# Patient Record
Sex: FEMALE | Race: WHITE | NOT HISPANIC OR LATINO | Employment: UNEMPLOYED | URBAN - METROPOLITAN AREA
[De-identification: names, ages, dates, MRNs, and addresses within clinical notes are randomized per-mention and may not be internally consistent; named-entity substitution may affect disease eponyms.]

---
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
## 2022-03-04 ENCOUNTER — CLINICAL SUPPORT (OUTPATIENT)
Dept: FAMILY MEDICINE CLINIC | Facility: CLINIC | Age: 22
End: 2022-03-04
Payer: COMMERCIAL

## 2022-03-04 DIAGNOSIS — Z30.019 ENCOUNTER FOR FEMALE BIRTH CONTROL: Primary | ICD-10-CM

## 2022-03-04 PROCEDURE — 96372 THER/PROPH/DIAG INJ SC/IM: CPT

## 2022-03-04 RX ORDER — MEDROXYPROGESTERONE ACETATE 150 MG/ML
150 INJECTION, SUSPENSION INTRAMUSCULAR ONCE
Status: COMPLETED | OUTPATIENT
Start: 2022-03-04 | End: 2022-03-04

## 2022-03-04 RX ADMIN — MEDROXYPROGESTERONE ACETATE 150 MG: 150 INJECTION, SUSPENSION INTRAMUSCULAR at 09:15

## 2022-05-20 ENCOUNTER — CLINICAL SUPPORT (OUTPATIENT)
Dept: FAMILY MEDICINE CLINIC | Facility: CLINIC | Age: 22
End: 2022-05-20
Payer: COMMERCIAL

## 2022-05-20 VITALS — TEMPERATURE: 98.5 F

## 2022-05-20 DIAGNOSIS — Z30.8 ENCOUNTER FOR OTHER CONTRACEPTIVE MANAGEMENT: Primary | ICD-10-CM

## 2022-05-20 PROCEDURE — 96372 THER/PROPH/DIAG INJ SC/IM: CPT

## 2022-05-20 RX ADMIN — METHYLPREDNISOLONE ACETATE 40 MG: 40 INJECTION, SUSPENSION INTRA-ARTICULAR; INTRALESIONAL; INTRAMUSCULAR; SOFT TISSUE at 08:43

## 2022-07-16 ENCOUNTER — HOSPITAL ENCOUNTER (EMERGENCY)
Facility: HOSPITAL | Age: 22
Discharge: HOME | End: 2022-07-17
Attending: EMERGENCY MEDICINE | Admitting: EMERGENCY MEDICINE
Payer: COMMERCIAL

## 2022-07-16 VITALS
DIASTOLIC BLOOD PRESSURE: 80 MMHG | HEART RATE: 112 BPM | TEMPERATURE: 99 F | SYSTOLIC BLOOD PRESSURE: 143 MMHG | RESPIRATION RATE: 20 BRPM | OXYGEN SATURATION: 98 %

## 2022-07-16 DIAGNOSIS — T63.481A LOCAL REACTION TO HYMENOPTERA STING: Primary | ICD-10-CM

## 2022-07-16 PROCEDURE — 99283 EMERGENCY DEPT VISIT LOW MDM: CPT

## 2022-07-16 RX ORDER — DIPHENHYDRAMINE HYDROCHLORIDE 50 MG/ML
25 INJECTION INTRAMUSCULAR; INTRAVENOUS ONCE
Status: DISCONTINUED | OUTPATIENT
Start: 2022-07-16 | End: 2022-07-16

## 2022-07-16 RX ORDER — NAPROXEN 500 MG/1
500 TABLET ORAL ONCE
Status: COMPLETED | OUTPATIENT
Start: 2022-07-16 | End: 2022-07-16

## 2022-07-16 RX ORDER — DIPHENHYDRAMINE HCL 25 MG
25 TABLET ORAL ONCE
Status: COMPLETED | OUTPATIENT
Start: 2022-07-17 | End: 2022-07-16

## 2022-07-16 RX ORDER — LIDOCAINE 50 MG/G
1 PATCH TOPICAL ONCE
Status: DISCONTINUED | OUTPATIENT
Start: 2022-07-16 | End: 2022-07-17 | Stop reason: HOSPADM

## 2022-07-16 RX ADMIN — DIPHENHYDRAMINE HYDROCHLORIDE 25 MG: 25 TABLET ORAL at 23:53

## 2022-07-16 RX ADMIN — NAPROXEN 500 MG: 500 TABLET ORAL at 23:53

## 2022-07-16 RX ADMIN — LIDOCAINE 5% 1 PATCH: 700 PATCH TOPICAL at 23:53

## 2022-07-17 PROCEDURE — 99284 EMERGENCY DEPT VISIT MOD MDM: CPT | Performed by: EMERGENCY MEDICINE

## 2022-07-17 NOTE — ED PROVIDER NOTES
Final Diagnosis:  1  Local reaction to hymenoptera sting        Chief Complaint   Patient presents with   Hezzie Gal Sting     Patient stung bottom R foot by a bee yesterday  Redness and swelling noted R foot  Denies any additional C/o     HPI  Patient was stung on bottom of her foot by presumed bee in the grass  Pulled out partial stinger  That was yesterday  Now there's some erythema and discomfort  No warmth  Blanches  Minimally tender  No crepitus  No systemic symptoms  No respiratory symptoms  Localized only  No hives  Tried a dose of benadryl at home     - No language barrier    - History obtained from patient  - There are no limitations to the history obtained  - Previous charting underwent limited review with attention to last ED visits, labs, ekgs, and prior imaging  PMH:   has a past medical history of Allergic, Heart murmur, and Tachycardia (12/14/2018)  PSH:   has no past surgical history on file  Social History:    Tobacco Use: High Risk    Smoking Tobacco Use: Current Some Day Smoker    Smokeless Tobacco Use: Never Used     Alcohol Use: Not on file     Patient with no illicit use    ROS:    Pertinent positives/negatives:   Review of Systems   Skin: Positive for color change  CONSTITUTIONAL:  No dizziness  No weakness  No unexpected weight loss  EYES:  No pain, erythema, or discharge  No loss of vision  ENT:  No tinnitus, decreased hearing  No epistaxis/purulent drainage  No voice change, airway closing, trismus  CARDIOVASCULAR:  No chest pain  No palpitations  No new lower extremity edema  RESPIRATORY:  No purulent cough  No hemoptysis  No dyspnea  No paroxysmal nocturnal dyspnea  No stridor, audible wheezing bedside  GASTROINTESTINAL:  Normal appetite  No vomiting, diarrhea  No pain  No bloating  No melena  GENITOURINARY:  No frequency, urgency, nocturia  No hematuria or dysuria  No discharge  No sores/adenopathy     MUSCULOSKELETAL:  No arthralgias or myalgias that are new   INTEGUMENTARY:  No swelling  No unexpected contusions  No abrasions  No lymphangitis  NEUROLOGIC:  No meningismus  No numbness of the extremities  No new focal weakness  No postural instability  PSYCHIATRIC:  No SI HI AVH  HEMATOLOGICAL:  No bleeding  No petechiae  No bruising  ALLERGIES:  No urticaria  No sudden abd cramping  No stridor  PE:     Physical exam highlights:   Physical Exam       Vitals:    07/16/22 2334   BP: 143/80   BP Location: Right arm   Pulse: (!) 112   Resp: 20   Temp: 99 °F (37 2 °C)   TempSrc: Oral   SpO2: 98%     Vitals reviewed by me  Nursing note reviewed  Chaperone present for all sensitive exam   Const: No acute distress  Alert  Nontoxic  Not diaphoretic  HEENT: External ears normal  No protrusion drainage swelling  Nose normal  No drainage/traumatic deformity  MMM  Mouth with baseline/symmetric movement  No trismus  Eyes: No squinting  No icterus  Tracks through the room with normal EOM  No tearing/swelling/drainage  Neck: ROM normal  No rigidity  No meningismus  Cards: Rate as per vitals  Compared to monitor sinus unless documented above  Regular  Well perfused  Pulm: able to verbalize without additional effort  Effort and excursion normal  No disress  No audible wheezing/ stridor  Normal resp rate  Abd: No distension beyond baseline  No fluctuant wave  Patient without peritoneal pain with shifting/bumping the bed  MSK: ROM normal and baseline  No deformity  Skin: No new rashes visible  Well perfused  Erythema surrounding puncture site  Small area of dark central  Partial stinger? Neuro: Nonfocal  Baseline  CN grossly intact  Moving all four with coordination  Psych: Normal behavior and affect  A:  - Nursing note reviewed  Ddx and MDM  Lidocaine patch placed and removed like a face peel, pulls out the stinger  Tip  No orders to display     No orders of the defined types were placed in this encounter      Labs Reviewed - No data to display    Final Diagnosis:  1  Local reaction to hymenoptera sting        P:  - hospital tx includes   Medications   naproxen (NAPROSYN) tablet 500 mg (500 mg Oral Given 7/16/22 2353)   diphenhydrAMINE (BENADRYL) tablet 25 mg (25 mg Oral Given 7/16/22 2353)         - disposition  Time reflects when diagnosis was documented in both MDM as applicable and the Disposition within this note     Time User Action Codes Description Comment    7/17/2022 12:04 AM Noel Lucero Add [[id number] [id number]] Local reaction to hymenoptera sting       ED Disposition     ED Disposition   Discharge    Condition   Stable    Date/Time   Sun Jul 17, 2022 12:04 AM    Comment   Iggy Shell discharge to home/self care  Follow-up Information     Follow up With Specialties Details Why Contact Info Additional Information    Sunni Palacios MD Family Medicine  For wound re-check [address] [address]  [phone]       [facility]   [address] [facility] [address] [phone] [address], [address], [phone]          - patient will call their PCP to let them know they were in the emergency department  We discuss return precautions       - additional tx intended, if consistent with primary provider:  - patient to follow with :      Discharge Medication List as of 7/17/2022 12:06 AM      CONTINUE these medications which have NOT CHANGED    Details   medroxyPROGESTERone (DEPO-PROVERA) 150 mg/mL injection inject 1 milliliter intramuscularly every 3 months, Normal           No discharge procedures on file  Prior to Admission Medications   Prescriptions Last Dose Informant Patient Reported? Taking?    medroxyPROGESTERone (DEPO-PROVERA) 150 mg/mL injection More than a month at Unknown time  No No   Sig: inject 1 milliliter intramuscularly every 3 months      Facility-Administered Medications Last Administration Doses Remaining   medroxyPROGESTERone acetate (DEPO-PROVERA SYRINGE) IM injection 150 mg 4/22/2021 11:30 AM           Portions of the record may have been created with voice recognition software  Occasional wrong word or "sound a like" substitutions may have occurred due to the inherent limitations of voice recognition software  Read the chart carefully and recognize, using context, where substitutions have occurred      Electronically signed by:  MD Tara Lawrence MD  07/17/22 2058

## 2022-07-19 ENCOUNTER — HOSPITAL ENCOUNTER (EMERGENCY)
Facility: HOSPITAL | Age: 22
Discharge: HOME | End: 2022-07-19
Attending: EMERGENCY MEDICINE
Payer: COMMERCIAL

## 2022-07-19 VITALS
SYSTOLIC BLOOD PRESSURE: 123 MMHG | DIASTOLIC BLOOD PRESSURE: 71 MMHG | OXYGEN SATURATION: 98 % | HEART RATE: 76 BPM | WEIGHT: 165.34 LBS | RESPIRATION RATE: 21 BRPM | BODY MASS INDEX: 29.76 KG/M2 | TEMPERATURE: 98.6 F

## 2022-07-19 DIAGNOSIS — R00.2 PALPITATIONS: Primary | ICD-10-CM

## 2022-07-19 LAB
ALBUMIN SERPL BCP-MCNC: 4.3 G/DL (ref 3.5–5)
ALP SERPL-CCNC: 70 U/L (ref 46–384)
ALT SERPL W P-5'-P-CCNC: 25 U/L (ref 12–78)
ANION GAP SERPL CALCULATED.3IONS-SCNC: 11 MMOL/L (ref 4–13)
AST SERPL W P-5'-P-CCNC: 17 U/L (ref 5–45)
ATRIAL RATE: 99 BPM
BASOPHILS # BLD AUTO: 0.05 THOUSANDS/ΜL (ref 0–0.1)
BASOPHILS NFR BLD AUTO: 1 % (ref 0–1)
BILIRUB SERPL-MCNC: 0.53 MG/DL (ref 0.2–1)
BUN SERPL-MCNC: 9 MG/DL (ref 5–25)
CALCIUM SERPL-MCNC: 9.4 MG/DL (ref 8.3–10.1)
CARDIAC TROPONIN I PNL SERPL HS: <2 NG/L
CHLORIDE SERPL-SCNC: 106 MMOL/L (ref 96–108)
CO2 SERPL-SCNC: 25 MMOL/L (ref 21–32)
CREAT SERPL-MCNC: 1.13 MG/DL (ref 0.6–1.3)
D DIMER PPP FEU-MCNC: <0.27 UG/ML FEU
EOSINOPHIL # BLD AUTO: 0.07 THOUSAND/ΜL (ref 0–0.61)
EOSINOPHIL NFR BLD AUTO: 1 % (ref 0–6)
ERYTHROCYTE [DISTWIDTH] IN BLOOD BY AUTOMATED COUNT: 13.3 % (ref 11.6–15.1)
EXT PREG TEST URINE: NEGATIVE
EXT. CONTROL ED NAV: NORMAL
GFR SERPL CREATININE-BSD FRML MDRD: 70 ML/MIN/1.73SQ M
GLUCOSE SERPL-MCNC: 121 MG/DL (ref 65–140)
HCT VFR BLD AUTO: 40.9 % (ref 34.8–46.1)
HGB BLD-MCNC: 13.3 G/DL (ref 11.5–15.4)
IMM GRANULOCYTES # BLD AUTO: 0.02 THOUSAND/UL (ref 0–0.2)
IMM GRANULOCYTES NFR BLD AUTO: 0 % (ref 0–2)
LYMPHOCYTES # BLD AUTO: 1.68 THOUSANDS/ΜL (ref 0.6–4.47)
LYMPHOCYTES NFR BLD AUTO: 17 % (ref 14–44)
MAGNESIUM SERPL-MCNC: 2 MG/DL (ref 1.6–2.6)
MCH RBC QN AUTO: 28.7 PG (ref 26.8–34.3)
MCHC RBC AUTO-ENTMCNC: 32.5 G/DL (ref 31.4–37.4)
MCV RBC AUTO: 88 FL (ref 82–98)
MONOCYTES # BLD AUTO: 0.51 THOUSAND/ΜL (ref 0.17–1.22)
MONOCYTES NFR BLD AUTO: 5 % (ref 4–12)
NEUTROPHILS # BLD AUTO: 7.3 THOUSANDS/ΜL (ref 1.85–7.62)
NEUTS SEG NFR BLD AUTO: 76 % (ref 43–75)
NRBC BLD AUTO-RTO: 0 /100 WBCS
NT-PROBNP SERPL-MCNC: 21 PG/ML
P AXIS: 72 DEGREES
PLATELET # BLD AUTO: 256 THOUSANDS/UL (ref 149–390)
PMV BLD AUTO: 9.9 FL (ref 8.9–12.7)
POTASSIUM SERPL-SCNC: 3.5 MMOL/L (ref 3.5–5.3)
PR INTERVAL: 126 MS
PROT SERPL-MCNC: 7.8 G/DL (ref 6.4–8.4)
QRS AXIS: 80 DEGREES
QRSD INTERVAL: 76 MS
QT INTERVAL: 336 MS
QTC INTERVAL: 431 MS
RBC # BLD AUTO: 4.63 MILLION/UL (ref 3.81–5.12)
SODIUM SERPL-SCNC: 142 MMOL/L (ref 135–147)
T WAVE AXIS: 21 DEGREES
TSH SERPL DL<=0.05 MIU/L-ACNC: 1.72 UIU/ML (ref 0.45–4.5)
VENTRICULAR RATE: 99 BPM
WBC # BLD AUTO: 9.63 THOUSAND/UL (ref 4.31–10.16)

## 2022-07-19 PROCEDURE — 85379 FIBRIN DEGRADATION QUANT: CPT | Performed by: EMERGENCY MEDICINE

## 2022-07-19 PROCEDURE — 96374 THER/PROPH/DIAG INJ IV PUSH: CPT

## 2022-07-19 PROCEDURE — 83880 ASSAY OF NATRIURETIC PEPTIDE: CPT | Performed by: EMERGENCY MEDICINE

## 2022-07-19 PROCEDURE — 99285 EMERGENCY DEPT VISIT HI MDM: CPT | Performed by: EMERGENCY MEDICINE

## 2022-07-19 PROCEDURE — 96361 HYDRATE IV INFUSION ADD-ON: CPT

## 2022-07-19 PROCEDURE — 83735 ASSAY OF MAGNESIUM: CPT | Performed by: EMERGENCY MEDICINE

## 2022-07-19 PROCEDURE — 99285 EMERGENCY DEPT VISIT HI MDM: CPT

## 2022-07-19 PROCEDURE — 84484 ASSAY OF TROPONIN QUANT: CPT | Performed by: EMERGENCY MEDICINE

## 2022-07-19 PROCEDURE — 80053 COMPREHEN METABOLIC PANEL: CPT | Performed by: EMERGENCY MEDICINE

## 2022-07-19 PROCEDURE — 85025 COMPLETE CBC W/AUTO DIFF WBC: CPT | Performed by: EMERGENCY MEDICINE

## 2022-07-19 PROCEDURE — 84443 ASSAY THYROID STIM HORMONE: CPT | Performed by: EMERGENCY MEDICINE

## 2022-07-19 PROCEDURE — 81025 URINE PREGNANCY TEST: CPT | Performed by: EMERGENCY MEDICINE

## 2022-07-19 PROCEDURE — 93005 ELECTROCARDIOGRAM TRACING: CPT

## 2022-07-19 PROCEDURE — 93010 ELECTROCARDIOGRAM REPORT: CPT | Performed by: INTERNAL MEDICINE

## 2022-07-19 PROCEDURE — 36415 COLL VENOUS BLD VENIPUNCTURE: CPT | Performed by: EMERGENCY MEDICINE

## 2022-07-19 RX ORDER — LORAZEPAM 2 MG/ML
0.5 INJECTION INTRAMUSCULAR ONCE
Status: COMPLETED | OUTPATIENT
Start: 2022-07-19 | End: 2022-07-19

## 2022-07-19 RX ORDER — DIPHENHYDRAMINE HCL 25 MG
25 CAPSULE ORAL EVERY 6 HOURS PRN
COMMUNITY
End: 2022-07-21

## 2022-07-19 RX ORDER — HYDROXYZINE HYDROCHLORIDE 25 MG/1
25 TABLET, FILM COATED ORAL EVERY 6 HOURS
Qty: 12 TABLET | Refills: 0 | Status: SHIPPED | OUTPATIENT
Start: 2022-07-19

## 2022-07-19 RX ADMIN — SODIUM CHLORIDE 1000 ML: 0.9 INJECTION, SOLUTION INTRAVENOUS at 12:53

## 2022-07-19 RX ADMIN — LORAZEPAM 0.5 MG: 2 INJECTION INTRAMUSCULAR; INTRAVENOUS at 13:03

## 2022-07-19 NOTE — ED PROVIDER NOTES
History  Chief Complaint   Patient presents with    Palpitations     Pt states palpitations since today  Pt was stung by a bee two days ago  68-year-old female presents with acute onset palpitations that started today  She appears very anxious and tearful in the ED  Denies any chest pain shortness of breath pleurisy leg swelling nausea vomiting abdominal pain diarrhea or any other symptoms  No fevers or chills  Did not use excessive caffeine does smoke marijuana  No other illicit drug use no alcohol use noted  Is on the Depo shot  History provided by:  Patient   used: No        Prior to Admission Medications   Prescriptions Last Dose Informant Patient Reported? Taking? diphenhydrAMINE (BENADRYL) 25 mg capsule  Self Yes Yes   Sig: Take 25 mg by mouth every 6 (six) hours as needed for itching   medroxyPROGESTERone (DEPO-PROVERA) 150 mg/mL injection   No No   Sig: inject 1 milliliter intramuscularly every 3 months      Facility-Administered Medications Last Administration Doses Remaining   medroxyPROGESTERone acetate (DEPO-PROVERA SYRINGE) IM injection 150 mg 4/22/2021 11:30 AM           Past Medical History:   Diagnosis Date    Allergic     Heart murmur     Tachycardia 12/14/2018       History reviewed  No pertinent surgical history  Family History   Problem Relation Age of Onset    Asthma Mother     Diabetes Maternal Grandmother     Cancer Maternal Aunt      I have reviewed and agree with the history as documented      E-Cigarette/Vaping    E-Cigarette Use Never User      E-Cigarette/Vaping Substances    Nicotine No     THC No     CBD No     Flavoring No     Other No     Unknown No      Social History     Tobacco Use    Smoking status: Current Some Day Smoker    Smokeless tobacco: Never Used    Tobacco comment: vapes   Vaping Use    Vaping Use: Never used   Substance Use Topics    Alcohol use: No    Drug use: Yes     Frequency: 7 0 times per week     Types: Marijuana     Comment: uses daily       Review of Systems   Constitutional: Negative  HENT: Negative  Eyes: Negative  Respiratory: Negative  Cardiovascular: Positive for palpitations  Gastrointestinal: Negative  Endocrine: Negative  Genitourinary: Negative  Musculoskeletal: Negative  Skin: Negative  Allergic/Immunologic: Negative  Neurological: Negative  Hematological: Negative  Psychiatric/Behavioral: Negative  All other systems reviewed and are negative  Physical Exam  Physical Exam  Constitutional:       Appearance: Normal appearance  HENT:      Head: Normocephalic and atraumatic  Nose: Nose normal       Mouth/Throat:      Mouth: Mucous membranes are moist    Eyes:      Extraocular Movements: Extraocular movements intact  Pupils: Pupils are equal, round, and reactive to light  Cardiovascular:      Rate and Rhythm: Regular rhythm  Tachycardia present  Pulmonary:      Effort: Pulmonary effort is normal       Breath sounds: Normal breath sounds  Abdominal:      General: Abdomen is flat  Bowel sounds are normal       Palpations: Abdomen is soft  Musculoskeletal:         General: Normal range of motion  Cervical back: Normal range of motion and neck supple  Skin:     General: Skin is warm  Capillary Refill: Capillary refill takes less than 2 seconds  Neurological:      General: No focal deficit present  Mental Status: She is alert and oriented to person, place, and time  Mental status is at baseline  Psychiatric:         Mood and Affect: Mood normal          Thought Content:  Thought content normal          Vital Signs  ED Triage Vitals   Temperature Pulse Respirations Blood Pressure SpO2   07/19/22 1221 07/19/22 1221 07/19/22 1221 07/19/22 1221 07/19/22 1221   98 6 °F (37 °C) (!) 137 16 (!) 196/86 98 %      Temp Source Heart Rate Source Patient Position - Orthostatic VS BP Location FiO2 (%)   07/19/22 1221 07/19/22 1221 07/19/22 1221 07/19/22 1221 --   Tympanic Monitor Sitting Left arm       Pain Score       07/19/22 1300       No Pain           Vitals:    07/19/22 1221 07/19/22 1223 07/19/22 1300 07/19/22 1400   BP: (!) 196/86  130/76 123/71   Pulse: (!) 137 (!) 116 100 76   Patient Position - Orthostatic VS: Sitting            Visual Acuity      ED Medications  Medications   sodium chloride 0 9 % bolus 1,000 mL (0 mL Intravenous Stopped 7/19/22 1432)   LORazepam (ATIVAN) injection 0 5 mg (0 5 mg Intravenous Given 7/19/22 1303)       Diagnostic Studies  Results Reviewed     Procedure Component Value Units Date/Time    Magnesium [[id number]]  (Normal) Collected: 07/19/22 1245    Lab Status: Final result Specimen: Blood from Arm, Right Updated: 07/19/22 1338     Magnesium 2 0 mg/dL     TSH [[id number]]  (Normal) Collected: 07/19/22 1245    Lab Status: Final result Specimen: Blood from Arm, Right Updated: 07/19/22 1338     TSH 3RD GENERATON 1 717 uIU/mL     Narrative:      Patients undergoing fluorescein dye angiography may retain small amounts of fluorescein in the body for 48-72 hours post procedure  Samples containing fluorescein can produce falsely depressed TSH values  If the patient had this procedure,a specimen should be resubmitted post fluorescein clearance        NT-BNP PRO [[id number]]  (Normal) Collected: 07/19/22 1245    Lab Status: Final result Specimen: Blood from Arm, Right Updated: 07/19/22 1338     NT-proBNP 21 pg/mL     HS Troponin 0hr (reflex protocol) [[id number]]  (Normal) Collected: 07/19/22 1245    Lab Status: Final result Specimen: Blood from Arm, Right Updated: 07/19/22 1336     hs TnI 0hr <2 ng/L     Comprehensive metabolic panel [[id number]] Collected: 07/19/22 1245    Lab Status: Final result Specimen: Blood from Arm, Right Updated: 07/19/22 1330     Sodium 142 mmol/L      Potassium 3 5 mmol/L      Chloride 106 mmol/L      CO2 25 mmol/L      ANION GAP 11 mmol/L      BUN 9 mg/dL      Creatinine 1 13 mg/dL      Glucose 121 mg/dL      Calcium 9 4 mg/dL      AST 17 U/L      ALT 25 U/L      Alkaline Phosphatase 70 U/L      Total Protein 7 8 g/dL      Albumin 4 3 g/dL      Total Bilirubin 0 53 mg/dL      eGFR 70 ml/min/1 73sq m     Narrative:      [place] guidelines for Chronic Kidney Disease (CKD):     Stage 1 with normal or high GFR (GFR > 90 mL/min/1 73 square meters)    Stage 2 Mild CKD (GFR = 60-89 mL/min/1 73 square meters)    Stage 3A Moderate CKD (GFR = 45-59 mL/min/1 73 square meters)    Stage 3B Moderate CKD (GFR = 30-44 mL/min/1 73 square meters)    Stage 4 Severe CKD (GFR = 15-29 mL/min/1 73 square meters)    Stage 5 End Stage CKD (GFR <15 mL/min/1 73 square meters)  Note: GFR calculation is accurate only with a steady state creatinine    D-Dimer [[id number]]  (Normal) Collected: 07/19/22 1245    Lab Status: Final result Specimen: Blood from Arm, Right Updated: 07/19/22 1318     D-Dimer, Quant <0 27 ug/ml FEU     UA (URINE) with reflex to Scope [[id number]] Updated: 07/19/22 1259    Lab Status: No result Specimen: Urine, Clean Catch     POCT pregnancy, urine [[id number]]  (Normal) Resulted: 07/19/22 1258    Lab Status: Final result Updated: 07/19/22 1258     EXT PREG TEST UR (Ref: Negative) negative     Control valid    CBC and differential [[id number]]  (Abnormal) Collected: 07/19/22 1245    Lab Status: Final result Specimen: Blood from Arm, Right Updated: 07/19/22 1250     WBC 9 63 Thousand/uL      RBC 4 63 Million/uL      Hemoglobin 13 3 g/dL      Hematocrit 40 9 %      MCV 88 fL      MCH 28 7 pg      MCHC 32 5 g/dL      RDW 13 3 %      MPV 9 9 fL      Platelets 155 Thousands/uL      nRBC 0 /100 WBCs      Neutrophils Relative 76 %      Immat GRANS % 0 %      Lymphocytes Relative 17 %      Monocytes Relative 5 %      Eosinophils Relative 1 %      Basophils Relative 1 %      Neutrophils Absolute 7 30 Thousands/µL      Immature Grans Absolute 0 02 Thousand/uL      Lymphocytes Absolute 1 68 Thousands/µL      Monocytes Absolute 0 51 Thousand/µL      Eosinophils Absolute 0 07 Thousand/µL      Basophils Absolute 0 05 Thousands/µL                  No orders to display              Procedures  ECG 12 Lead Documentation Only    Date/Time: 7/19/2022 4:20 PM  Performed by: Judy Huertas DO  Authorized by: Judy Huertas DO     ECG reviewed by me, the ED Provider: yes    Patient location:  ED  Previous ECG:     Previous ECG:  Unavailable    Comparison to cardiac monitor: Yes    Interpretation:     Interpretation: abnormal    Rate:     ECG rate assessment: tachycardic    Rhythm:     Rhythm: sinus rhythm    Ectopy:     Ectopy: none    QRS:     QRS axis:  Normal  Conduction:     Conduction: normal    ST segments:     ST segments:  Non-specific  T waves:     T waves: non-specific               ED Course         CRAFFT    Flowsheet Row Most Recent Value   SBIRT (13-23 yo)    In order to provide better care to our patients, we are screening all of our patients for alcohol and drug use  Would it be okay to ask you these screening questions? Yes Filed at: 07/19/2022 1424   SUSHIL Initial Screen: During the past 12 months, did you:    1  Drink any alcohol (more than a few sips)? No Filed at: 07/19/2022 1424   2  Smoke any marijuana or hashish Yes Filed at: 07/19/2022 1424   3  Use anything else to get high? ("anything else" includes illegal drugs, over the counter and prescription drugs, and things that you sniff or 'albert')? Yes Filed at: 07/19/2022 1424   SUSHIL Full Screen: During the past 12 months:    1  Have you ever ridden in a car driven by someone (including yourself) who was "high" or had been using alcohol or drugs? 0 Filed at: 07/19/2022 1424   2  Do you ever use alcohol or drugs to relax, feel better about yourself, or fit in? 0 Filed at: 07/19/2022 1424   3  Do you ever use alcohol/drugs while you are by yourself, alone? 0 Filed at: 07/19/2022 1424   4   Do you ever forget things you did while using alcohol or drugs? 0 Filed at: 07/19/2022 1424   5  Do your family or friends ever tell you that you should cut down on your drinking or drug use? 0 Filed at: 07/19/2022 1424   6  Have you gotten into trouble while you were using alcohol or drugs? 0 Filed at: 07/19/2022 1424   CRAFFT Score 0 Filed at: 07/19/2022 1424                                          [facility]  Number of Diagnoses or Management Options     Amount and/or Complexity of Data Reviewed  Clinical lab tests: ordered and reviewed  Tests in the medicine section of CPT®: ordered and reviewed    Patient Progress  Patient progress: stable      Disposition  Final diagnoses:   Palpitations     Time reflects when diagnosis was documented in both MDM as applicable and the Disposition within this note     Time User Action Codes Description Comment    7/19/2022  2:22 PM Rudi Triana Add [R00 2] Palpitations       ED Disposition     ED Disposition   Discharge    Condition   Stable    Date/Time   Tue Jul 19, 2022  2:22 PM    Comment   Leonela Diez discharge to home/self care                 Follow-up Information     Follow up With Specialties Details Why Contact Info Additional Information    Alfornia Cushing, MD Family Medicine Schedule an appointment as soon as possible for a visit   [address] [address]       [facility] Emergency Department Emergency Medicine   [address] [facility] [address]  [address] [address] Emergency Department, [facility], [address]          Discharge Medication List as of 7/19/2022  2:23 PM      CONTINUE these medications which have NOT CHANGED    Details   diphenhydrAMINE (BENADRYL) 25 mg capsule Take 25 mg by mouth every 6 (six) hours as needed for itching, Historical Med      medroxyPROGESTERone (DEPO-PROVERA) 150 mg/mL injection inject 1 milliliter intramuscularly every 3 months, Normal             No discharge procedures on file     PDMP Review     None          ED Provider  Electronically Signed by           Ashleigh Roque DO  07/19/22 9036

## 2022-07-21 ENCOUNTER — OFFICE VISIT (OUTPATIENT)
Dept: FAMILY MEDICINE CLINIC | Facility: CLINIC | Age: 22
End: 2022-07-21
Payer: COMMERCIAL

## 2022-07-21 VITALS
DIASTOLIC BLOOD PRESSURE: 80 MMHG | SYSTOLIC BLOOD PRESSURE: 120 MMHG | TEMPERATURE: 98.6 F | HEIGHT: 65 IN | BODY MASS INDEX: 24.83 KG/M2 | WEIGHT: 149 LBS | HEART RATE: 80 BPM | RESPIRATION RATE: 16 BRPM

## 2022-07-21 DIAGNOSIS — T63.441D BEE STING REACTION, ACCIDENTAL OR UNINTENTIONAL, SUBSEQUENT ENCOUNTER: ICD-10-CM

## 2022-07-21 DIAGNOSIS — M79.671 FOOT PAIN, RIGHT: Primary | ICD-10-CM

## 2022-07-21 PROCEDURE — 99213 OFFICE O/P EST LOW 20 MIN: CPT | Performed by: FAMILY MEDICINE

## 2022-08-02 DIAGNOSIS — Z30.019 ENCOUNTER FOR FEMALE BIRTH CONTROL: ICD-10-CM

## 2022-08-02 RX ORDER — MEDROXYPROGESTERONE ACETATE 150 MG/ML
INJECTION, SUSPENSION INTRAMUSCULAR
Qty: 1 ML | Refills: 0 | Status: SHIPPED | OUTPATIENT
Start: 2022-08-02 | End: 2022-09-23

## 2022-08-05 ENCOUNTER — CLINICAL SUPPORT (OUTPATIENT)
Dept: FAMILY MEDICINE CLINIC | Facility: CLINIC | Age: 22
End: 2022-08-05
Payer: COMMERCIAL

## 2022-08-05 DIAGNOSIS — Z30.019 ENCOUNTER FOR FEMALE BIRTH CONTROL: Primary | ICD-10-CM

## 2022-08-05 PROCEDURE — 96372 THER/PROPH/DIAG INJ SC/IM: CPT

## 2022-08-05 RX ORDER — MEDROXYPROGESTERONE ACETATE 150 MG/ML
150 INJECTION, SUSPENSION INTRAMUSCULAR ONCE
Status: COMPLETED | OUTPATIENT
Start: 2022-08-05 | End: 2022-08-05

## 2022-08-05 RX ADMIN — MEDROXYPROGESTERONE ACETATE 150 MG: 150 INJECTION, SUSPENSION INTRAMUSCULAR at 13:52

## 2022-08-07 ENCOUNTER — HOSPITAL ENCOUNTER (EMERGENCY)
Facility: HOSPITAL | Age: 22
Discharge: HOME | End: 2022-08-08
Attending: EMERGENCY MEDICINE
Payer: COMMERCIAL

## 2022-08-07 DIAGNOSIS — F41.9 ANXIETY: ICD-10-CM

## 2022-08-07 DIAGNOSIS — R00.2 PALPITATIONS: Primary | ICD-10-CM

## 2022-08-07 LAB
EXT PREG TEST URINE: NEGATIVE
EXT. CONTROL ED NAV: NORMAL

## 2022-08-07 PROCEDURE — 93005 ELECTROCARDIOGRAM TRACING: CPT

## 2022-08-07 PROCEDURE — 81025 URINE PREGNANCY TEST: CPT | Performed by: EMERGENCY MEDICINE

## 2022-08-07 PROCEDURE — 99285 EMERGENCY DEPT VISIT HI MDM: CPT

## 2022-08-07 RX ORDER — DIAZEPAM 5 MG/1
5 TABLET ORAL ONCE
Status: COMPLETED | OUTPATIENT
Start: 2022-08-07 | End: 2022-08-07

## 2022-08-07 RX ADMIN — DIAZEPAM 5 MG: 5 TABLET ORAL at 23:19

## 2022-08-08 VITALS
SYSTOLIC BLOOD PRESSURE: 127 MMHG | BODY MASS INDEX: 25.23 KG/M2 | RESPIRATION RATE: 17 BRPM | OXYGEN SATURATION: 99 % | TEMPERATURE: 99.3 F | DIASTOLIC BLOOD PRESSURE: 74 MMHG | WEIGHT: 151.6 LBS | HEART RATE: 85 BPM

## 2022-08-08 PROCEDURE — 99284 EMERGENCY DEPT VISIT MOD MDM: CPT | Performed by: EMERGENCY MEDICINE

## 2022-08-08 NOTE — ED PROVIDER NOTES
Final Diagnosis:  1  Palpitations    2  Anxiety        Chief Complaint   Patient presents with    Palpitations     Pt states racing heartbeat that started at rest  Similar episode last month that she came to hospital for but was unresolved  HPI  24 yo presents w palpitaiton  No chest pain  Had recent eval for this came in 130s sinus  Dimer checked, negative  Trop checked, negative  Ultimately improved with ativan but she didn't like how it felt  Was watching tik tok today when started  Presents with HR 120s  Sinus on ekg  ekg changes nonspecific and same as last time  Previously followed with cards and had holter monitor  Unremarkable  She's terrified of needles and stable so we agree to trial oral anxiety meds and oral rehydration to see if there's improvement  There is      - No language barrier    - History obtained from patient  - There are no limitations to the history obtained  - Previous charting underwent limited review with attention to last ED visits, labs, ekgs, and prior imaging  PMH:   has a past medical history of Allergic, Heart murmur, and Tachycardia (12/14/2018)  PSH:   has no past surgical history on file  Social History:    Tobacco Use: High Risk    Smoking Tobacco Use: Current Some Day Smoker    Smokeless Tobacco Use: Never Used     Alcohol Use: Not on file     Patient with no illicit use    ROS:    Pertinent positives/negatives:   Review of Systems   Respiratory: Negative for shortness of breath  Cardiovascular: Positive for palpitations  Negative for chest pain  CONSTITUTIONAL:  No dizziness  No weakness  No unexpected weight loss  EYES:  No pain, erythema, or discharge  No loss of vision  ENT:  No tinnitus, decreased hearing  No epistaxis/purulent drainage  No voice change, airway closing, trismus  CARDIOVASCULAR:  No chest pain  No palpitations  No new lower extremity edema  RESPIRATORY:  No purulent cough  No hemoptysis  No dyspnea   No paroxysmal nocturnal dyspnea  No stridor, audible wheezing bedside  GASTROINTESTINAL:  Normal appetite  No vomiting, diarrhea  No pain  No bloating  No melena  GENITOURINARY:  No frequency, urgency, nocturia  No hematuria or dysuria  No discharge  No sores/adenopathy  MUSCULOSKELETAL:  No arthralgias or myalgias that are new  INTEGUMENTARY:  No swelling  No unexpected contusions  No abrasions  No lymphangitis  NEUROLOGIC:  No meningismus  No numbness of the extremities  No new focal weakness  No postural instability  PSYCHIATRIC:  No SI HI AVH  HEMATOLOGICAL:  No bleeding  No petechiae  No bruising  ALLERGIES:  No urticaria  No sudden abd cramping  No stridor  PE:     Physical exam highlights:   Physical Exam       Vitals:    08/07/22 2323 08/07/22 2335 08/08/22 0015 08/08/22 0033   BP: 142/76   127/74   BP Location:    Left arm   Pulse: (!) 126 (!) 108 96 85   Resp: (!) 24  18 17   Temp:       TempSrc:       SpO2: 99%  98% 99%   Weight:         Vitals reviewed by me  Nursing note reviewed  Chaperone present for all sensitive exam   Const: No acute distress  Alert  Nontoxic  Not diaphoretic  HEENT: External ears normal  No protrusion drainage swelling  Nose normal  No drainage/traumatic deformity  MMM  Mouth with baseline/symmetric movement  No trismus  Eyes: No squinting  No icterus  Tracks through the room with normal EOM  No tearing/swelling/drainage  Neck: ROM normal  No rigidity  No meningismus  Cards: Rate as per vitals  Tachy Compared to monitor sinus unless documented above  Regular  Well perfused  Pulm: able to verbalize without additional effort  Effort and excursion normal  No disress  No audible wheezing/ stridor  Normal resp rate  Abd: No distension beyond baseline  No fluctuant wave  Patient without peritoneal pain with shifting/bumping the bed  MSK: ROM normal and baseline  No deformity  Skin: No new rashes visible  Well perfused  Neuro: Nonfocal  Baseline  CN grossly intact   Moving all four with coordination  Psych: anxious  Keeps saying "I don't wanna be here"        A:  - Nursing note reviewed  Ddx and MDM  Anxiety  dehydartion    No estrogen - on depot provera  No recent travel/stasis  No surgeries  No malignancy  No prior DVT PE      eval for pregnancy                        No orders to display     Orders Placed This Encounter   Procedures    Ambulatory Referral to Cardiology    Holter monitor    POCT pregnancy, urine    ECG 12 lead    ECG 12 lead     Labs Reviewed   POCT PREGNANCY, URINE - Normal       Result Value Ref Range Status    EXT PREG TEST UR (Ref: Negative) negative   Final    Control valid   Final       Final Diagnosis:  1  Palpitations    2  Anxiety        P:  - hospital tx includes   Medications   diazepam (VALIUM) tablet 5 mg (5 mg Oral Given 8/7/22 5922)         - disposition  Time reflects when diagnosis was documented in both MDM as applicable and the Disposition within this note     Time User Action Codes Description Comment    8/8/2022 12:12 AM Mercedez De La Vega Add [R00 2] Palpitations     8/8/2022 12:12 AM Mercedez De La Vega Add [F41 9] Anxiety       ED Disposition     ED Disposition   Discharge    Condition   Stable    Date/Time   Mon Aug 8, 2022 12:12 AM    Comment   Leigh Burris discharge to home/self care  Follow-up Information     Follow up With Specialties Details Why [address]  [facility], [address] [place] [address]   [phone]            - patient will call their PCP to let them know they were in the emergency department   We discuss return precautions       - additional tx intended, if consistent with primary provider:  - patient to follow with :      Discharge Medication List as of 8/8/2022 12:30 AM      CONTINUE these medications which have NOT CHANGED    Details   hydrOXYzine HCL (ATARAX) 25 mg tablet Take 1 tablet (25 mg total) by mouth every 6 (six) hours, Starting Tue 7/19/2022, Normal medroxyPROGESTERone (DEPO-PROVERA) 150 mg/mL injection inject 1 milliliter intramuscularly every 3 months, Normal           Outpatient Discharge Orders   Ambulatory Referral to Cardiology   Standing Status: Future Standing Exp  Date: 08/08/23      Holter monitor   Standing Status: Future Standing Exp  Date: 08/08/26     Prior to Admission Medications   Prescriptions Last Dose Informant Patient Reported? Taking?   hydrOXYzine HCL (ATARAX) 25 mg tablet   No No   Sig: Take 1 tablet (25 mg total) by mouth every 6 (six) hours   medroxyPROGESTERone (DEPO-PROVERA) 150 mg/mL injection   No No   Sig: inject 1 milliliter intramuscularly every 3 months      Facility-Administered Medications Last Administration Doses Remaining   medroxyPROGESTERone acetate (DEPO-PROVERA SYRINGE) IM injection 150 mg 4/22/2021 11:30 AM           Portions of the record may have been created with voice recognition software  Occasional wrong word or "sound a like" substitutions may have occurred due to the inherent limitations of voice recognition software  Read the chart carefully and recognize, using context, where substitutions have occurred      Electronically signed by:  MD Melissa Stevens MD  08/08/22 0096

## 2022-08-08 NOTE — PROGRESS NOTES
Chief Complaint   Patient presents with   Judd Collie Sting     07/16/22 went to ER on 07/18/22 was given benadryl the next day her heart was racing and was sent to ER by squad  07/20/22 vomiting 07/21/22 MLQ pain ,  Stinger possibly still on foot         Patient ID: Gonzalez Flores is a 25 y o  female  HPI   pt is seeing for f/u ER visit 3 days ago for a bee sting to R foot -  Has palpitations, abd pain, vomiting-  Was Tx with Benadryl -  Still has pain in R foot     The following portions of the patient's history were reviewed and updated as appropriate: allergies, current medications, past family history, past medical history, past social history, past surgical history and problem list     Review of Systems   Constitutional: Negative  Respiratory: Negative  Cardiovascular: Negative  Gastrointestinal: Negative  Genitourinary: Negative  Musculoskeletal: Positive for myalgias  Negative for gait problem  Skin: Negative  Neurological: Negative  Current Outpatient Medications   Medication Sig Dispense Refill    hydrOXYzine HCL (ATARAX) 25 mg tablet Take 1 tablet (25 mg total) by mouth every 6 (six) hours 12 tablet 0    medroxyPROGESTERone (DEPO-PROVERA) 150 mg/mL injection inject 1 milliliter intramuscularly every 3 months 1 mL 0     Current Facility-Administered Medications   Medication Dose Route Frequency Provider Last Rate Last Admin    medroxyPROGESTERone acetate (DEPO-PROVERA SYRINGE) IM injection 150 mg  150 mg Intramuscular Q3 Months Chapo Bless, DO   150 mg at 04/22/21 1130       Objective:    /80 (BP Location: Left arm, Patient Position: Sitting, Cuff Size: Standard)   Pulse 80   Temp 98 6 °F (37 °C)   Resp 16   Ht 5' 5" (1 651 m)   Wt 67 6 kg (149 lb)   LMP  (LMP Unknown)   BMI 24 79 kg/m²        Physical Exam  Constitutional:       Appearance: She is not ill-appearing     Musculoskeletal:      Right foot: Normal       Comments: Except for insect bite kevin on the plantar aspect of the mid foot    Skin:     Findings: No erythema or rash  Neurological:      Mental Status: She is alert                   Assessment/Plan:         Diagnoses and all orders for this visit:    Foot pain, right    Bee sting reaction, accidental or unintentional, subsequent encounter      stinger was removed  - pain in the foot improved right away     rto prn                       Osei Szymanski MD

## 2022-08-12 ENCOUNTER — HOSPITAL ENCOUNTER (OUTPATIENT)
Dept: NON INVASIVE DIAGNOSTICS | Facility: HOSPITAL | Age: 22
Discharge: HOME | End: 2022-08-12
Attending: EMERGENCY MEDICINE
Payer: COMMERCIAL

## 2022-08-12 DIAGNOSIS — R00.2 PALPITATIONS: ICD-10-CM

## 2022-08-12 PROCEDURE — 93226 XTRNL ECG REC<48 HR SCAN A/R: CPT

## 2022-08-12 PROCEDURE — 93225 XTRNL ECG REC<48 HRS REC: CPT

## 2022-08-14 LAB
ATRIAL RATE: 121 BPM
P AXIS: 64 DEGREES
PR INTERVAL: 122 MS
QRS AXIS: 72 DEGREES
QRSD INTERVAL: 76 MS
QT INTERVAL: 304 MS
QTC INTERVAL: 431 MS
T WAVE AXIS: -12 DEGREES
VENTRICULAR RATE: 121 BPM

## 2022-08-14 PROCEDURE — 93010 ELECTROCARDIOGRAM REPORT: CPT | Performed by: INTERNAL MEDICINE

## 2022-08-22 ENCOUNTER — HOSPITAL ENCOUNTER (EMERGENCY)
Facility: HOSPITAL | Age: 22
Discharge: HOME | End: 2022-08-22
Attending: EMERGENCY MEDICINE
Payer: COMMERCIAL

## 2022-08-22 VITALS
RESPIRATION RATE: 16 BRPM | SYSTOLIC BLOOD PRESSURE: 151 MMHG | OXYGEN SATURATION: 99 % | DIASTOLIC BLOOD PRESSURE: 74 MMHG | TEMPERATURE: 99.1 F | HEART RATE: 109 BPM | WEIGHT: 145.8 LBS | BODY MASS INDEX: 24.26 KG/M2

## 2022-08-22 DIAGNOSIS — R00.2 PALPITATIONS: Primary | ICD-10-CM

## 2022-08-22 DIAGNOSIS — F41.9 ANXIETY: ICD-10-CM

## 2022-08-22 PROCEDURE — 99284 EMERGENCY DEPT VISIT MOD MDM: CPT | Performed by: EMERGENCY MEDICINE

## 2022-08-22 PROCEDURE — 93005 ELECTROCARDIOGRAM TRACING: CPT

## 2022-08-22 PROCEDURE — 99285 EMERGENCY DEPT VISIT HI MDM: CPT

## 2022-08-22 RX ORDER — PAROXETINE 10 MG/1
10 TABLET, FILM COATED ORAL ONCE
Status: COMPLETED | OUTPATIENT
Start: 2022-08-22 | End: 2022-08-22

## 2022-08-22 RX ORDER — HYDROXYZINE HYDROCHLORIDE 25 MG/1
25 TABLET, FILM COATED ORAL 3 TIMES DAILY PRN
Qty: 15 TABLET | Refills: 0 | Status: SHIPPED | OUTPATIENT
Start: 2022-08-22

## 2022-08-22 RX ORDER — PAROXETINE 10 MG/1
10 TABLET, FILM COATED ORAL DAILY
Qty: 20 TABLET | Refills: 1 | Status: SHIPPED | OUTPATIENT
Start: 2022-08-22 | End: 2022-08-25 | Stop reason: SDUPTHER

## 2022-08-22 RX ADMIN — PAROXETINE HYDROCHLORIDE 10 MG: 10 TABLET, FILM COATED ORAL at 22:49

## 2022-08-23 LAB
ATRIAL RATE: 89 BPM
ATRIAL RATE: 91 BPM
P AXIS: 68 DEGREES
P AXIS: 70 DEGREES
PR INTERVAL: 124 MS
PR INTERVAL: 126 MS
QRS AXIS: 69 DEGREES
QRS AXIS: 71 DEGREES
QRSD INTERVAL: 74 MS
QRSD INTERVAL: 76 MS
QT INTERVAL: 340 MS
QT INTERVAL: 346 MS
QTC INTERVAL: 418 MS
QTC INTERVAL: 420 MS
T WAVE AXIS: 10 DEGREES
T WAVE AXIS: 11 DEGREES
VENTRICULAR RATE: 89 BPM
VENTRICULAR RATE: 91 BPM

## 2022-08-23 PROCEDURE — 93010 ELECTROCARDIOGRAM REPORT: CPT | Performed by: INTERNAL MEDICINE

## 2022-08-23 NOTE — DISCHARGE INSTRUCTIONS
Start the paxil daily and you can use the Atarax as prescribed as needed for symptoms  Your EKG, exam and vital signs are ok tonight  See your doctor for follow up on the medications and see the cardiologist as planned  In general, rest as needed, eat healthy, avoid caffeine, keep hydrated

## 2022-08-23 NOTE — ED PROVIDER NOTES
History  Chief Complaint   Patient presents with    Palpitations     Palpitations started tonight while laying down  Cardiology apt for Friday to review halter monitor info  26 yo female has been having episodes of heart racing off and on x one month  Seen here several times  Had normal Ddimer, trop, TSH and other labs  Given BZD here with improvement  Did have outpt  holter and has appointment with cardiology in 4 days to get results  Has not been using any meds at home  No chest pain, sob, dizziness, fainting, fever, cough, vomiting, diarrhea  History provided by:  Patient   used: No    Palpitations  Associated symptoms: no back pain, no chest pain, no cough, no dizziness, no nausea, no shortness of breath and no vomiting        Prior to Admission Medications   Prescriptions Last Dose Informant Patient Reported? Taking?   hydrOXYzine HCL (ATARAX) 25 mg tablet Not Taking at Unknown time  No No   Sig: Take 1 tablet (25 mg total) by mouth every 6 (six) hours   Patient not taking: Reported on 8/22/2022   medroxyPROGESTERone (DEPO-PROVERA) 150 mg/mL injection   No No   Sig: inject 1 milliliter intramuscularly every 3 months      Facility-Administered Medications Last Administration Doses Remaining   medroxyPROGESTERone acetate (DEPO-PROVERA SYRINGE) IM injection 150 mg 4/22/2021 11:30 AM           Past Medical History:   Diagnosis Date    Allergic     Heart murmur     Tachycardia 12/14/2018       History reviewed  No pertinent surgical history  Family History   Problem Relation Age of Onset    Asthma Mother     Diabetes Maternal Grandmother     Cancer Maternal Aunt      I have reviewed and agree with the history as documented      E-Cigarette/Vaping    E-Cigarette Use Never User      E-Cigarette/Vaping Substances    Nicotine No     THC No     CBD No     Flavoring No     Other No     Unknown No      Social History     Tobacco Use    Smoking status: Former Smoker    Smokeless tobacco: Never Used    Tobacco comment: vapes   Vaping Use    Vaping Use: Never used   Substance Use Topics    Alcohol use: No    Drug use: Yes     Frequency: 7 0 times per week     Types: Marijuana     Comment: uses daily       Review of Systems   Constitutional: Negative  Negative for fever  HENT: Negative  Eyes: Negative  Respiratory: Negative  Negative for cough and shortness of breath  Cardiovascular: Positive for palpitations  Negative for chest pain  Gastrointestinal: Negative  Negative for abdominal pain, diarrhea, nausea and vomiting  Genitourinary: Negative  Negative for dysuria and flank pain  Musculoskeletal: Negative  Negative for back pain and myalgias  Skin: Negative  Negative for rash  Neurological: Negative  Negative for dizziness and headaches  Hematological: Does not bruise/bleed easily  Psychiatric/Behavioral: Negative  All other systems reviewed and are negative  Physical Exam  Physical Exam  Vitals and nursing note reviewed  Constitutional:       General: She is not in acute distress  Appearance: She is well-developed  She is not ill-appearing or diaphoretic  HENT:      Head: Normocephalic and atraumatic  Right Ear: External ear normal       Left Ear: External ear normal    Eyes:      General: No scleral icterus  Conjunctiva/sclera: Conjunctivae normal    Cardiovascular:      Rate and Rhythm: Normal rate and regular rhythm  Heart sounds: Normal heart sounds  No murmur heard  Pulmonary:      Effort: Pulmonary effort is normal  No respiratory distress  Breath sounds: Normal breath sounds  Chest:      Chest wall: No tenderness  Abdominal:      General: Bowel sounds are normal  There is no distension  Palpations: Abdomen is soft  Tenderness: There is no abdominal tenderness  Musculoskeletal:         General: No tenderness or deformity  Normal range of motion        Cervical back: Normal range of motion and neck supple  Right lower leg: No edema  Left lower leg: No edema  Skin:     General: Skin is warm and dry  Coloration: Skin is not pale  Findings: No rash  Neurological:      General: No focal deficit present  Mental Status: She is alert and oriented to person, place, and time  Cranial Nerves: No cranial nerve deficit  Motor: No weakness     Psychiatric:         Mood and Affect: Mood normal          Behavior: Behavior normal          Vital Signs  ED Triage Vitals [08/22/22 2214]   Temperature Pulse Respirations Blood Pressure SpO2   99 1 °F (37 3 °C) (!) 109 16 151/74 99 %      Temp Source Heart Rate Source Patient Position - Orthostatic VS BP Location FiO2 (%)   Oral Monitor Sitting Right arm --      Pain Score       --           Vitals:    08/22/22 2214   BP: 151/74   Pulse: (!) 109   Patient Position - Orthostatic VS: Sitting         Visual Acuity      ED Medications  Medications   PARoxetine (PAXIL) tablet 10 mg (has no administration in time range)       Diagnostic Studies  Results Reviewed     None                 No orders to display              Procedures  ECG 12 Lead Documentation Only    Date/Time: 8/22/2022 10:23 PM  Performed by: Lino Hernandes MD  Authorized by: Lino Hernandes MD     Indications / Diagnosis:  Palpitations  ECG reviewed by me, the ED Provider: yes    Patient location:  ED  Previous ECG:     Previous ECG:  Compared to current    Similarity:  No change  Interpretation:     Interpretation: abnormal    Rate:     ECG rate:  89    ECG rate assessment: normal    Rhythm:     Rhythm: sinus rhythm    Ectopy:     Ectopy: none    QRS:     QRS axis:  Normal  Conduction:     Conduction: normal    ST segments:     ST segments:  Non-specific  T waves:     T waves: non-specific    Comments:      Nonspecific ST-T morphology unchanged from prior EKG             ED Course         CRAFFT    Flowsheet Row Most Recent Value   SBIRT (13-23 yo)    In order to provide better care to our patients, we are screening all of our patients for alcohol and drug use  Would it be okay to ask you these screening questions? No Filed at: 08/22/2022 2225   SUSHIL Initial Screen: During the past 12 months, did you:    1  Drink any alcohol (more than a few sips)? No Filed at: 08/22/2022 2225   2  Smoke any marijuana or hashish No Filed at: 08/22/2022 2225   3  Use anything else to get high? ("anything else" includes illegal drugs, over the counter and prescription drugs, and things that you sniff or 'albert')? No Filed at: 08/22/2022 2225                                          [facility]  Number of Diagnoses or Management Options  Anxiety  Palpitations  Diagnosis management comments: Mom wants pt  To get started on medicine for anxiety until pt  Can get in to see cardiology and PCP  Will start paxil and prn atarax (pt  Did not know it had been prescribed last time so they never picked it up)  Advised rest, eat healthy, avoid caffeine, keep hydrated  Disposition  Final diagnoses:   Palpitations   Anxiety     Time reflects when diagnosis was documented in both MDM as applicable and the Disposition within this note     Time User Action Codes Description Comment    2/23/1588 60:86 PM Nate Saleh Add [I97 4] Palpitations     0/69/0374 27:87 PM Nate Saleh Add [U19 7] Anxiety       ED Disposition     ED Disposition   Discharge    Condition   Stable    Date/Time   Mon Aug 22, 2022 10:36 PM    Comment   Anthony Hein discharge to home/self care                 Follow-up Information     Follow up With Specialties Details Why Contact Info    your doctor  Schedule an appointment as soon as possible for a visit             Patient's Medications   Discharge Prescriptions    HYDROXYZINE HCL (ATARAX) 25 MG TABLET    Take 1 tablet (25 mg total) by mouth 3 (three) times a day as needed for anxiety       Start Date: 8/22/2022 End Date: --       Order Dose: 25 mg       Quantity: 15 tablet    Refills: 0 PAROXETINE (PAXIL) 10 MG TABLET    Take 1 tablet (10 mg total) by mouth daily       Start Date: 8/22/2022 End Date: --       Order Dose: 10 mg       Quantity: 20 tablet    Refills: 1       No discharge procedures on file      PDMP Review     None          ED Provider  Electronically Signed by           Bib Sharpe MD  17/50/85 9506

## 2022-08-24 ENCOUNTER — HOSPITAL ENCOUNTER (EMERGENCY)
Facility: HOSPITAL | Age: 22
Discharge: HOME | End: 2022-08-24
Attending: EMERGENCY MEDICINE
Payer: COMMERCIAL

## 2022-08-24 VITALS
HEART RATE: 98 BPM | TEMPERATURE: 99 F | OXYGEN SATURATION: 98 % | RESPIRATION RATE: 16 BRPM | SYSTOLIC BLOOD PRESSURE: 130 MMHG | DIASTOLIC BLOOD PRESSURE: 76 MMHG

## 2022-08-24 DIAGNOSIS — R20.2 PARESTHESIAS: Primary | ICD-10-CM

## 2022-08-24 PROCEDURE — 99283 EMERGENCY DEPT VISIT LOW MDM: CPT

## 2022-08-24 PROCEDURE — 93227 XTRNL ECG REC<48 HR R&I: CPT | Performed by: INTERNAL MEDICINE

## 2022-08-24 PROCEDURE — 99282 EMERGENCY DEPT VISIT SF MDM: CPT | Performed by: EMERGENCY MEDICINE

## 2022-08-24 NOTE — ED PROVIDER NOTES
History  Chief Complaint   Patient presents with   Hattie Nayak Sting     Per mom: pt was stung by several bees in July, today she started with R foot numbness and L foot numbness  Mom states she has been seen by several specialists/ER's, normal ekgs, bloodwork, etc  Thinks it may be anxiety related  States anxiety medication helped but the doctor forgot to call in to the pharmacy  24-year-old female, presents with complaint of numbness on the bottom of right foot and pain in left arm started earlier today  Patient states symptoms constant with no modifying factors  Denies any swelling or skin changes, no weakness  Mother reports patient was stung might be using July, since then has been having multiple complaints including anxiety and palpitations  Patient has been seen in ED multiple times for the symptoms, is scheduled to follow-up with PCP tomorrow and cardiologist on Friday  Patient denies any current palpitations, chest pain, or shortness of breath  History provided by:  Patient and parent   used: No        Prior to Admission Medications   Prescriptions Last Dose Informant Patient Reported? Taking?    PARoxetine (PAXIL) 10 mg tablet   No No   Sig: Take 1 tablet (10 mg total) by mouth daily   hydrOXYzine HCL (ATARAX) 25 mg tablet   No No   Sig: Take 1 tablet (25 mg total) by mouth every 6 (six) hours   Patient not taking: Reported on 8/22/2022   hydrOXYzine HCL (ATARAX) 25 mg tablet   No No   Sig: Take 1 tablet (25 mg total) by mouth 3 (three) times a day as needed for anxiety   medroxyPROGESTERone (DEPO-PROVERA) 150 mg/mL injection   No No   Sig: inject 1 milliliter intramuscularly every 3 months      Facility-Administered Medications Last Administration Doses Remaining   medroxyPROGESTERone acetate (DEPO-PROVERA SYRINGE) IM injection 150 mg 4/22/2021 11:30 AM           Past Medical History:   Diagnosis Date    Allergic     Heart murmur     Tachycardia 12/14/2018       History reviewed  No pertinent surgical history  Family History   Problem Relation Age of Onset    Asthma Mother     Diabetes Maternal Grandmother     Cancer Maternal Aunt      I have reviewed and agree with the history as documented  E-Cigarette/Vaping    E-Cigarette Use Never User      E-Cigarette/Vaping Substances    Nicotine No     THC No     CBD No     Flavoring No     Other No     Unknown No      Social History     Tobacco Use    Smoking status: Former Smoker    Smokeless tobacco: Never Used    Tobacco comment: vapes   Vaping Use    Vaping Use: Never used   Substance Use Topics    Alcohol use: No    Drug use: Yes     Frequency: 7 0 times per week     Types: Marijuana     Comment: uses daily       Review of Systems   Constitutional: Negative  Negative for fever  Respiratory: Negative  Cardiovascular: Negative for chest pain  Gastrointestinal: Negative  Skin: Negative  Negative for rash  Neurological: Negative for weakness  Psychiatric/Behavioral: The patient is nervous/anxious  Physical Exam  Physical Exam  Vitals and nursing note reviewed  Constitutional:       General: She is not in acute distress  HENT:      Head: Normocephalic and atraumatic  Eyes:      Extraocular Movements: Extraocular movements intact  Pupils: Pupils are equal, round, and reactive to light  Cardiovascular:      Rate and Rhythm: Normal rate and regular rhythm  Pulmonary:      Effort: Pulmonary effort is normal       Breath sounds: Normal breath sounds  Musculoskeletal:         General: No swelling or tenderness  Normal range of motion  Skin:     General: Skin is warm and dry  Findings: No erythema or rash  Neurological:      General: No focal deficit present  Mental Status: She is alert and oriented to person, place, and time  Motor: No weakness        Gait: Gait normal          Vital Signs  ED Triage Vitals [08/24/22 1617]   Temperature Pulse Respirations Blood Pressure SpO2   99 °F (37 2 °C) 98 16 130/76 98 %      Temp Source Heart Rate Source Patient Position - Orthostatic VS BP Location FiO2 (%)   Oral Monitor -- Right arm --      Pain Score       4           Vitals:    08/24/22 1617   BP: 130/76   Pulse: 98         Visual Acuity      ED Medications  Medications - No data to display    Diagnostic Studies  Results Reviewed     None                 No orders to display              Procedures  Procedures         ED Course                                             MDM  Number of Diagnoses or Management Options  Paresthesias  Diagnosis management comments: 81-year-old female, presenting with complaint of numbness to spot on bottom of right foot and pain in left arm  Patient has been having and multiple symptoms including palpitations and anxiety since getting stung by a bee in July of this year  Patient has been seen in ED multiple times, symptoms thought to be due to anxiety  On exam, patient with normal strength in all 4 extremities, able to stand and ambulate without difficulty  Normal bilateral DP and radial pulses with good cap refill in feet and hands  Vital signs unremarkable, previous medical records including a EKG done 2 days ago reviewed with no acute changes  Has been wearing Holter monitor, has appointment with Cardiology this week as well as PCP tomorrow  Discussed with patient and mother following up for further evaluation         Amount and/or Complexity of Data Reviewed  Review and summarize past medical records: yes        Disposition  Final diagnoses:   Paresthesias     Time reflects when diagnosis was documented in both MDM as applicable and the Disposition within this note     Time User Action Codes Description Comment    8/24/2022  4:56 PM Maggy Fung Add [R20 2] Paresthesias       ED Disposition     ED Disposition   Discharge    Condition   Stable    Date/Time   Wed Aug 24, 2022  4:56 PM    Kristian Bartlett discharge to home/self care                Follow-up Information     Follow up With Specialties Details Why Contact Info    Janene Rodas MD Family Medicine Schedule an appointment as soon as possible for a visit   [address]  [phone]            Discharge Medication List as of 8/24/2022  4:57 PM      CONTINUE these medications which have NOT CHANGED    Details   !! hydrOXYzine HCL (ATARAX) 25 mg tablet Take 1 tablet (25 mg total) by mouth every 6 (six) hours, Starting Tue 7/19/2022, Normal      !! hydrOXYzine HCL (ATARAX) 25 mg tablet Take 1 tablet (25 mg total) by mouth 3 (three) times a day as needed for anxiety, Starting Mon 8/22/2022, Normal      medroxyPROGESTERone (DEPO-PROVERA) 150 mg/mL injection inject 1 milliliter intramuscularly every 3 months, Normal      PARoxetine (PAXIL) 10 mg tablet Take 1 tablet (10 mg total) by mouth daily, Starting Mon 8/22/2022, Normal       !! - Potential duplicate medications found  Please discuss with provider  No discharge procedures on file      PDMP Review     None          ED Provider  Electronically Signed by           Prabhu Malave MD  08/24/22 8781

## 2022-08-25 ENCOUNTER — OFFICE VISIT (OUTPATIENT)
Dept: FAMILY MEDICINE CLINIC | Facility: CLINIC | Age: 22
End: 2022-08-25
Payer: COMMERCIAL

## 2022-08-25 VITALS
BODY MASS INDEX: 26.13 KG/M2 | RESPIRATION RATE: 14 BRPM | DIASTOLIC BLOOD PRESSURE: 80 MMHG | OXYGEN SATURATION: 99 % | HEIGHT: 62 IN | WEIGHT: 142 LBS | HEART RATE: 100 BPM | TEMPERATURE: 98.2 F | SYSTOLIC BLOOD PRESSURE: 124 MMHG

## 2022-08-25 DIAGNOSIS — F41.9 ANXIETY: ICD-10-CM

## 2022-08-25 DIAGNOSIS — R00.2 PALPITATIONS: Primary | ICD-10-CM

## 2022-08-25 DIAGNOSIS — S90.851S: ICD-10-CM

## 2022-08-25 PROCEDURE — 3725F SCREEN DEPRESSION PERFORMED: CPT | Performed by: FAMILY MEDICINE

## 2022-08-25 PROCEDURE — 99214 OFFICE O/P EST MOD 30 MIN: CPT | Performed by: FAMILY MEDICINE

## 2022-08-25 RX ORDER — PAROXETINE 10 MG/1
10 TABLET, FILM COATED ORAL DAILY
Qty: 30 TABLET | Refills: 1 | Status: SHIPPED | OUTPATIENT
Start: 2022-08-25 | End: 2022-09-23 | Stop reason: SDUPTHER

## 2022-08-25 NOTE — PROGRESS NOTES
Anthony Hein 2000 female MRN: [id number]    [address] [place] [address]      ASSESSMENT/PLAN  Anthony Hein is a 25 y o  female presents to the office for    Diagnoses and all orders for this visit:    Palpitations    Anxiety  -     PARoxetine (PAXIL) 10 mg tablet; Take 1 tablet (10 mg total) by mouth daily    Splinter of right foot without major open wound or infection, sequela     Recommend continuing Paxil  Reviewed Holter monitor  Believe that the patient would benefit from a low-dose beta blocker but will wait for Cardiology to do an evaluation  Small stating her of the be was taken out of her right foot with the lancet and cleaned with alcohol swab  Discussed results of Holter showing some SVT       Return in 1 month  Future Appointments   Date Time Provider Unruly Beardi   8/26/2022 10:20 AM Micha Stanton MD [address] [place] [address] Drive   9/23/2022 11:30 AM Ruiz Jackson MD [address] [place] [address] [facility]          SUBJECTIVE  CC: Follow-up (Rapid HR since bee sting in July, anxiety )      HPI:  Anthony Hein is a 25 y o  female who presents for acute appointment  Patient has unfortunately been in the emergency room multiple visits since getting a bee sting on her right foot  Patient was seen in the hospital on July 19th and August 7 for palpitations  She was discharged with a Holter monitor and advised to report to our office for evaluation  She is already scheduled with Dr Annelle Hatchet tomorrow  Patient states that she gets this palpitation at any moment in time and never had this truly before  She was started on Paxil 10 mg and states that it has been helping her  Had some numbness in the area of the bee sting but currently asymptomatic    Review of Systems   Constitutional: Negative for activity change, appetite change, chills, fatigue and fever  HENT: Negative for congestion      Respiratory: Negative for cough, chest tightness and shortness of breath  Cardiovascular: Negative for chest pain and leg swelling  Gastrointestinal: Negative for abdominal distention, abdominal pain, constipation, diarrhea, nausea and vomiting  All other systems reviewed and are negative  Historical Information   The patient history was reviewed as follows:  Past Medical History:   Diagnosis Date    Allergic     Heart murmur     Tachycardia 12/14/2018         Medications:     Current Outpatient Medications:     hydrOXYzine HCL (ATARAX) 25 mg tablet, Take 1 tablet (25 mg total) by mouth 3 (three) times a day as needed for anxiety, Disp: 15 tablet, Rfl: 0    medroxyPROGESTERone (DEPO-PROVERA) 150 mg/mL injection, inject 1 milliliter intramuscularly every 3 months, Disp: 1 mL, Rfl: 0    PARoxetine (PAXIL) 10 mg tablet, Take 1 tablet (10 mg total) by mouth daily, Disp: 30 tablet, Rfl: 1    hydrOXYzine HCL (ATARAX) 25 mg tablet, Take 1 tablet (25 mg total) by mouth every 6 (six) hours (Patient not taking: Reported on 8/25/2022), Disp: 12 tablet, Rfl: 0    Current Facility-Administered Medications:     medroxyPROGESTERone acetate (DEPO-PROVERA SYRINGE) IM injection 150 mg, 150 mg, Intramuscular, Q3 Months, Aldo Jade DO, 150 mg at 04/22/21 1130    No Known Allergies    OBJECTIVE  Vitals:   Vitals:    08/25/22 1525   BP: 124/80   BP Location: Left arm   Patient Position: Sitting   Cuff Size: Standard   Pulse: 100   Resp: 14   Temp: 98 2 °F (36 8 °C)   SpO2: 99%   Weight: 64 4 kg (142 lb)   Height: 5' 2 25" (1 581 m)         Physical Exam  Vitals reviewed  Constitutional:       Appearance: She is well-developed  HENT:      Head: Normocephalic and atraumatic  Eyes:      Conjunctiva/sclera: Conjunctivae normal       Pupils: Pupils are equal, round, and reactive to light  Cardiovascular:      Rate and Rhythm: Normal rate and regular rhythm  Heart sounds: Normal heart sounds  Pulmonary:      Effort: Pulmonary effort is normal  No respiratory distress  Breath sounds: Normal breath sounds  Musculoskeletal:         General: Normal range of motion  Cervical back: Normal range of motion and neck supple  Skin:     General: Skin is warm  Capillary Refill: Capillary refill takes less than 2 seconds  Comments: Right foot splinter seen which resembles a stinger; removed with tweezers and lancets; alcohol swab to clean the area patient tolerated well  Microfilament intact   Neurological:      Mental Status: She is alert and oriented to person, place, and time                      Dayana Shafer MD,   [facility]  8/25/2022

## 2022-08-26 ENCOUNTER — TELEPHONE (OUTPATIENT)
Dept: CARDIOLOGY CLINIC | Facility: CLINIC | Age: 22
End: 2022-08-26

## 2022-08-26 ENCOUNTER — OFFICE VISIT (OUTPATIENT)
Dept: CARDIOLOGY CLINIC | Facility: CLINIC | Age: 22
End: 2022-08-26
Payer: COMMERCIAL

## 2022-08-26 ENCOUNTER — TELEPHONE (OUTPATIENT)
Dept: FAMILY MEDICINE CLINIC | Facility: CLINIC | Age: 22
End: 2022-08-26

## 2022-08-26 VITALS
HEIGHT: 62 IN | DIASTOLIC BLOOD PRESSURE: 84 MMHG | HEART RATE: 101 BPM | BODY MASS INDEX: 26.31 KG/M2 | OXYGEN SATURATION: 99 % | TEMPERATURE: 98 F | SYSTOLIC BLOOD PRESSURE: 122 MMHG | WEIGHT: 143 LBS

## 2022-08-26 DIAGNOSIS — R00.2 PALPITATIONS: Primary | ICD-10-CM

## 2022-08-26 PROCEDURE — 93000 ELECTROCARDIOGRAM COMPLETE: CPT | Performed by: INTERNAL MEDICINE

## 2022-08-26 PROCEDURE — 99214 OFFICE O/P EST MOD 30 MIN: CPT | Performed by: INTERNAL MEDICINE

## 2022-08-26 NOTE — PROGRESS NOTES
Progress Note - Cardiology Office  [facility]    Tom Muñoz 25 y o  female MRN: [id number]  : 2000  Encounter: [id number]      ASSESSMENT:   Palpitations  Was seen in the ED on 2022 again    Tilt-table test, 2021 was normal    48 hour Holter monitor, 2022  Sinus rhythm, average 78, range  per minute  No ventricular ectopy  Rare PACs, 1 PSVT of 32 beats at 150 per minute  No symptoms reported during the monitoring    Ambulatory cardiac monitoring, 2021  Sinus rhythm with average heart rate of 88, range of  per minute  Rare SVEs,< 1%  Very rare isolated VEs < 1%    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    RECOMMENDATIONS:  Extended ambulatory monitor  Echocardiogram, evaluate LV function, cardiac structures and rule out MVP  Regular cardiovascular exercise for 20-30 minutes daily  Avoid dehydration    Please call [phone] if any questions  HPI :     Tom Muñoz is a 25y o  year old female who came for follow up  Patient has been having episodes of heart racing off and on for more than a month  Has been to the ED several times  Her TSH, D-dimer, troponin and magnesium/potassium were normal  She had multiple bee stings in July and since then she has been having episodes of palpitations  EKG today shows sinus tachycardia with heart rate of 101 per minute  Previous monitors did not reveal any significant arrhythmia    REVIEW OF SYSTEMS:  Review of Systems   Cardiovascular: Positive for palpitations  All other systems reviewed and are negative  Historical Information   Past Medical History:   Diagnosis Date    Allergic     Heart murmur     Tachycardia 2018     No past surgical history on file    Social History     Substance and Sexual Activity   Alcohol Use No     Social History     Substance and Sexual Activity   Drug Use Yes    Frequency: 7 0 times per week    Types: Marijuana    Comment: uses daily     Social History     Tobacco Use   Smoking Status Former Smoker   Smokeless Tobacco Never Used   Tobacco Comment    vapes     Family History:   Family History   Problem Relation Age of Onset    Asthma Mother     Diabetes Maternal Grandmother     Cancer Maternal Aunt        Meds/Allergies     No Known Allergies    Current Outpatient Medications:     hydrOXYzine HCL (ATARAX) 25 mg tablet, Take 1 tablet (25 mg total) by mouth 3 (three) times a day as needed for anxiety, Disp: 15 tablet, Rfl: 0    medroxyPROGESTERone (DEPO-PROVERA) 150 mg/mL injection, inject 1 milliliter intramuscularly every 3 months, Disp: 1 mL, Rfl: 0    PARoxetine (PAXIL) 10 mg tablet, Take 1 tablet (10 mg total) by mouth daily, Disp: 30 tablet, Rfl: 1    hydrOXYzine HCL (ATARAX) 25 mg tablet, Take 1 tablet (25 mg total) by mouth every 6 (six) hours (Patient not taking: No sig reported), Disp: 12 tablet, Rfl: 0    Current Facility-Administered Medications:     medroxyPROGESTERone acetate (DEPO-PROVERA SYRINGE) IM injection 150 mg, 150 mg, Intramuscular, Q3 Months, Bibiana Concepcion DO, 150 mg at 04/22/21 1130    Vitals: Blood pressure 122/84, pulse 101, temperature 98 °F (36 7 °C), height 5' 2" (1 575 m), weight 64 9 kg (143 lb), SpO2 99 %, not currently breastfeeding  ?  Body mass index is 26 16 kg/m²    Vitals:    08/26/22 1005   Weight: 64 9 kg (143 lb)     BP Readings from Last 3 Encounters:   08/26/22 122/84   08/25/22 124/80   08/24/22 130/76       Physical Exam:    Neurologic:  Alert & oriented x 3, no new focal deficits, Not in any acute distress,  Constitutional:  Well developed, well nourished, non-toxic appearance   Eyes:  Pupil equal and reacting to light, conjunctiva normal,   HENT:  Atraumatic, oropharynx moist, Neck- normal range of motion, no tenderness,  Neck supple, No JVP, No LNP   Respiratory:  Bilateral air entry, mostly clear to auscultation  Cardiovascular: S1-S2 regular with a I/VI systolic murmur   GI:  Soft, nondistended, normal bowel sounds, nontender, no hepatosplenomegaly appreciated  Musculoskeletal:  No edema, no tenderness, no deformities  Skin:  Well hydrated, no rash   Lymphatic:  No lymphadenopathy noted   Extremities:  No edema and distal pulses are present        Diagnostic Studies Review Cardio:      EKG:  Sinus tachycardia, heart rate 101 per minute, nonspecific T-wave abnormality    Cardiac testing:   No results found for this or any previous visit  Results for orders placed during the hospital encounter of 22    Holter monitor    Interpretation Summary  PT NAME: Fitz Rothman  : 2000  AGE: 25 y o  GENDER: female  MRN: [id number]  PROCEDURE: Holter monitor - 48hour    DATE OF PROCEDURE:  2022  Date of analysis is 2022      INDICATIONS:  48 hour Holter monitor was performed evaluation of palpitations    PROCEDURE:  Underlying rhythm was sinus  Average heart rate was 76 BPM; minimum heart rate was sinus bradycardia at 44 BPM at 6:22 a m ; and maximum heart rate was sinus tachycardia at 169 BPM at 2:17 p m  Yair Calvo There were no ventricular ectopy    Supraventricular ectopic activity consisted of 1294 beats, which comprises 0 6% of total beats  Out of these eighty-eight were in couplets, 32 were in a single run and the rest were isolated  The PSVT run occurred at 11:50 a m  on D1 consisting of 32 beats at a maximum rate of 150 per minute    The patient's rhythm included 6 hours, 52 minutes and 20 seconds of bradycardia  The slowest single episode of bradycardia occurred at 6:21 a m  on D2, lasting 1 minute and 41 seconds with a minimum heart rate of 44 per minute    The patient's rhythm included 7 hours, 40 minutes and 39 seconds of tachycardia   The fastest single episode of tachycardia occurred at 12:10 p m  on D1, lasting 14 minutes and 20 seconds, with maximum HR of 169 BPM     No symptoms were reported in the patient's diary    Impression  48 hour Holter monitoring revealed sinus rhythm with an average heart rate of 78 and ranged from 44 to 169 per minute  There were no ventricular ectopy  There were rare PACs with 1 PSVT run of 32 beats at 150 per minute  Patient did not report any symptom during the monitoring        Interpreted by: Eliud Spivey MD, [facility]      Imaging:  Chest X-Ray:   No Chest XR results available for this patient  CT-scan of the chest:     No CTA results available for this patient    Lab Review   Lab Results   Component Value Date    WBC 9 63 07/19/2022    HGB 13 3 07/19/2022    HCT 40 9 07/19/2022    MCV 88 07/19/2022    RDW 13 3 07/19/2022     07/19/2022     BMP:  Lab Results   Component Value Date    SODIUM 142 07/19/2022    K 3 5 07/19/2022     07/19/2022    CO2 25 07/19/2022    BUN 9 07/19/2022    CREATININE 1 13 07/19/2022    GLUC 121 07/19/2022    CALCIUM 9 4 07/19/2022    EGFR 70 07/19/2022    MG 2 0 07/19/2022     LFT:  Lab Results   Component Value Date    AST 17 07/19/2022    ALT 25 07/19/2022    ALKPHOS 70 07/19/2022    TP 7 8 07/19/2022    ALB 4 3 07/19/2022      No components found for: [employer] [facility]  Lab Results   Component Value Date    JGB0VWVNILLH 1 717 07/19/2022     No results found for: HGBA1C  Lipid Profile:   No results found for: CHOLESTEROL, HDL, LDLCALC, TRIG  No results found for: CHOLESTEROL  No results found for: CKTOTAL, CKMB, CKMBINDEX, TROPONINI  Lab Results   Component Value Date    NTBNP 21 07/19/2022      Recent Results (from the past 672 hour(s))   ECG 12 lead    Collection Time: 08/07/22 10:46 PM   Result Value Ref Range    Ventricular Rate 121 BPM    Atrial Rate 121 BPM    CT Interval 122 ms    QRSD Interval 76 ms    QT Interval 304 ms    QTC Interval 431 ms    P [place] 64 degrees    QRS Axis 72 degrees    T Wave Axis -12 degrees   POCT pregnancy, urine    Collection Time: 08/07/22 11:19 PM   Result Value Ref Range    EXT PREG TEST UR (Ref: Negative) negative     Control valid    ECG 12 lead    Collection Time: 08/22/22 10:16 PM Result Value Ref Range    Ventricular Rate 91 BPM    Atrial Rate 91 BPM    MA Interval 124 ms    QRSD Interval 76 ms    QT Interval 340 ms    QTC Interval 418 ms    P [place] 68 degrees    QRS Axis 71 degrees    T Wave Axis 11 degrees   ECG 12 lead    Collection Time: 08/22/22 10:16 PM   Result Value Ref Range    Ventricular Rate 89 BPM    Atrial Rate 89 BPM    MA Interval 126 ms    QRSD Interval 74 ms    QT Interval 346 ms    QTC Interval 420 ms    P Axis 70 degrees    QRS Axis 69 degrees    T Wave Axis 10 degrees             Dr Bisi Haas MD, [facility]      "This note has been constructed using a voice recognition system  Therefore there may be syntax, spelling, and/or grammatical errors   Please call if you have any questions  "

## 2022-09-16 ENCOUNTER — HOSPITAL ENCOUNTER (OUTPATIENT)
Dept: NON INVASIVE DIAGNOSTICS | Facility: HOSPITAL | Age: 22
Discharge: HOME | End: 2022-09-16
Attending: INTERNAL MEDICINE
Payer: COMMERCIAL

## 2022-09-16 ENCOUNTER — TELEPHONE (OUTPATIENT)
Dept: CARDIOLOGY CLINIC | Facility: CLINIC | Age: 22
End: 2022-09-16

## 2022-09-16 VITALS
HEART RATE: 92 BPM | WEIGHT: 143 LBS | HEIGHT: 62 IN | SYSTOLIC BLOOD PRESSURE: 130 MMHG | BODY MASS INDEX: 26.31 KG/M2 | DIASTOLIC BLOOD PRESSURE: 76 MMHG

## 2022-09-16 DIAGNOSIS — R00.2 PALPITATIONS: ICD-10-CM

## 2022-09-16 LAB
AORTIC ROOT: 2.5 CM
APICAL FOUR CHAMBER EJECTION FRACTION: 61 %
AV LVOT PEAK GRADIENT: 3 MMHG
AV PEAK GRADIENT: 5 MMHG
DOP CALC LVOT AREA: 3.14 CM2
DOP CALC LVOT DIAMETER: 2 CM
E WAVE DECELERATION TIME: 164 MS
FRACTIONAL SHORTENING: 37 % (ref 28–44)
INTERVENTRICULAR SEPTUM IN DIASTOLE (PARASTERNAL SHORT AXIS VIEW): 0.7 CM
INTERVENTRICULAR SEPTUM: 0.7 CM (ref 0.6–1.1)
LAAS-AP2: 13.2 CM2
LAAS-AP4: 14.5 CM2
LEFT ATRIUM AREA SYSTOLE SINGLE PLANE A4C: 14.4 CM2
LEFT ATRIUM SIZE: 2.8 CM
LEFT INTERNAL DIMENSION IN SYSTOLE: 2.7 CM (ref 2.1–4)
LEFT VENTRICULAR INTERNAL DIMENSION IN DIASTOLE: 4.3 CM (ref 3.5–6)
LEFT VENTRICULAR POSTERIOR WALL IN END DIASTOLE: 0.7 CM
LEFT VENTRICULAR STROKE VOLUME: 57 ML
LVSV (TEICH): 57 ML
MV E'TISSUE VEL-SEP: 8 CM/S
MV PEAK A VEL: 0.52 M/S
MV PEAK E VEL: 58 CM/S
MV STENOSIS PRESSURE HALF TIME: 48 MS
MV VALVE AREA P 1/2 METHOD: 4.58 CM2
RIGHT ATRIUM AREA SYSTOLE A4C: 7.9 CM2
RIGHT VENTRICLE ID DIMENSION: 2.7 CM
SL CV LEFT ATRIUM LENGTH A2C: 4.2 CM
SL CV LV EF: 60
SL CV PED ECHO LEFT VENTRICLE DIASTOLIC VOLUME (MOD BIPLANE) 2D: 85 ML
SL CV PED ECHO LEFT VENTRICLE SYSTOLIC VOLUME (MOD BIPLANE) 2D: 28 ML

## 2022-09-16 PROCEDURE — 93306 TTE W/DOPPLER COMPLETE: CPT | Performed by: INTERNAL MEDICINE

## 2022-09-16 PROCEDURE — 93306 TTE W/DOPPLER COMPLETE: CPT

## 2022-09-16 NOTE — TELEPHONE ENCOUNTER
----- Message from Palmira Bah MD sent at 9/16/2022  3:51 PM EDT -----  Please call and inform the patient that the Echocardiogram showed normal pumping function of the heart  No significant valve abnormality was seen

## 2022-09-22 ENCOUNTER — CLINICAL SUPPORT (OUTPATIENT)
Dept: CARDIOLOGY CLINIC | Facility: CLINIC | Age: 22
End: 2022-09-22
Payer: COMMERCIAL

## 2022-09-22 DIAGNOSIS — R00.2 PALPITATIONS: ICD-10-CM

## 2022-09-22 PROCEDURE — 93248 EXT ECG>7D<15D REV&INTERPJ: CPT | Performed by: INTERNAL MEDICINE

## 2022-09-23 ENCOUNTER — OFFICE VISIT (OUTPATIENT)
Dept: FAMILY MEDICINE CLINIC | Facility: CLINIC | Age: 22
End: 2022-09-23
Payer: COMMERCIAL

## 2022-09-23 VITALS
BODY MASS INDEX: 27.42 KG/M2 | SYSTOLIC BLOOD PRESSURE: 110 MMHG | HEIGHT: 62 IN | HEART RATE: 104 BPM | TEMPERATURE: 98 F | DIASTOLIC BLOOD PRESSURE: 80 MMHG | RESPIRATION RATE: 14 BRPM | WEIGHT: 149 LBS

## 2022-09-23 DIAGNOSIS — F41.9 ANXIETY: Primary | ICD-10-CM

## 2022-09-23 DIAGNOSIS — Z30.019 ENCOUNTER FOR FEMALE BIRTH CONTROL: ICD-10-CM

## 2022-09-23 PROCEDURE — 99213 OFFICE O/P EST LOW 20 MIN: CPT | Performed by: FAMILY MEDICINE

## 2022-09-23 RX ORDER — PAROXETINE 10 MG/1
10 TABLET, FILM COATED ORAL DAILY
Qty: 90 TABLET | Refills: 1 | Status: SHIPPED | OUTPATIENT
Start: 2022-09-23 | End: 2022-09-30 | Stop reason: SDUPTHER

## 2022-09-23 NOTE — PROGRESS NOTES
Abimael Cohn 2000 female MRN: [id number]    [address] [place] [address]      ASSESSMENT/PLAN  Abimael Cohn is a 23 y o  female presents to the office for    Diagnoses and all orders for this visit:    Anxiety  -     PARoxetine (PAXIL) 10 mg tablet; Take 1 tablet (10 mg total) by mouth daily    Encounter for female birth control  Anxiety seems to be doing very well on Paxil 10 mg daily  Continue dosing  Patient thinks that Depo might be the cause of her anxiety  Recommend if it continues to persist we could trial her on an oral medication daily  BMI Counseling: Body mass index is 37 09 kg/m²  The BMI is above normal  Nutrition recommendations include decreasing portion sizes and consuming healthier snacks  Exercise recommendations include exercising 3-5 times per week  Rationale for BMI follow-up plan is due to patient being overweight or obese  Future Appointments   Date Time Provider Unruly Garcia   3/24/2023  9:45 AM Buckley Schlatter, MD [facility]          SUBJECTIVE  CC: Follow-up (Anxiety is getting better)      HPI:  Abimael Cohn is a 23 y o  female who presents for  a follow-up appointment  Patient states that she has been doing very well on anxiety medication  States she has not had any palpitations except for twice which she took the rescue medications for  Patient states that she was concerned because she read up on Depo and felt this was a side effect of the medication  Review of Systems   Constitutional: Negative for activity change, appetite change, chills, fatigue and fever  HENT: Negative for congestion  Respiratory: Negative for cough, chest tightness and shortness of breath  Cardiovascular: Negative for chest pain and leg swelling  Gastrointestinal: Negative for abdominal distention, abdominal pain, constipation, diarrhea, nausea and vomiting     Psychiatric/Behavioral: The patient is nervous/anxious (Improved)  All other systems reviewed and are negative  Historical Information   The patient history was reviewed as follows:  Past Medical History:   Diagnosis Date    Allergic     Heart murmur     Tachycardia 12/14/2018         Medications:     Current Outpatient Medications:     hydrOXYzine HCL (ATARAX) 25 mg tablet, Take 1 tablet (25 mg total) by mouth every 6 (six) hours, Disp: 12 tablet, Rfl: 0    PARoxetine (PAXIL) 10 mg tablet, Take 1 tablet (10 mg total) by mouth daily, Disp: 90 tablet, Rfl: 1    hydrOXYzine HCL (ATARAX) 25 mg tablet, Take 1 tablet (25 mg total) by mouth 3 (three) times a day as needed for anxiety, Disp: 15 tablet, Rfl: 0    Current Facility-Administered Medications:     medroxyPROGESTERone acetate (DEPO-PROVERA SYRINGE) IM injection 150 mg, 150 mg, Intramuscular, Q3 Months, Judith Ambrosia, DO, 150 mg at 04/22/21 1130    No Known Allergies    OBJECTIVE  Vitals:   Vitals:    09/23/22 1106   BP: 110/80   BP Location: Left arm   Patient Position: Sitting   Cuff Size: Standard   Pulse: 104   Resp: 14   Temp: 98 °F (36 7 °C)   Weight: 67 6 kg (149 lb)   Height: 5' 2" (1 575 m)         Physical Exam  Vitals reviewed  Constitutional:       Appearance: She is well-developed  HENT:      Head: Normocephalic and atraumatic  Eyes:      Conjunctiva/sclera: Conjunctivae normal       Pupils: Pupils are equal, round, and reactive to light  Cardiovascular:      Rate and Rhythm: Normal rate and regular rhythm  Heart sounds: Normal heart sounds  Pulmonary:      Effort: Pulmonary effort is normal  No respiratory distress  Breath sounds: Normal breath sounds  Musculoskeletal:         General: Normal range of motion  Cervical back: Normal range of motion and neck supple  Skin:     General: Skin is warm  Capillary Refill: Capillary refill takes less than 2 seconds     Neurological:      Mental Status: She is alert and oriented to person, place, and neela Ocasio MD,   [facility]  9/25/2022

## 2022-09-26 ENCOUNTER — TELEPHONE (OUTPATIENT)
Dept: CARDIOLOGY CLINIC | Facility: CLINIC | Age: 22
End: 2022-09-26

## 2022-09-26 NOTE — TELEPHONE ENCOUNTER
----- Message from Kamla Anders MD sent at 9/22/2022  2:17 PM EDT -----  Extended ambulatory cardiac monitor result  Monitoring was performed for 11 days and 17 hours    Underlying rhythm was sinus throughout  Average heart rate was 88 and ranged from  per minute  Isolated SVEs were rare (<1 0%), and no SVE Couplets or SVE Triplets were present  Isolated VEs were rare (<1 0%), and no VE Couplets or VE Triplets were  present    There were no arrhythmias, heart blocks or pauses detected  There were no patient triggered events during the monitoring

## 2022-09-27 ENCOUNTER — VBI (OUTPATIENT)
Dept: ADMINISTRATIVE | Facility: OTHER | Age: 22
End: 2022-09-27

## 2022-09-30 DIAGNOSIS — F41.9 ANXIETY: ICD-10-CM

## 2022-09-30 RX ORDER — PAROXETINE 10 MG/1
10 TABLET, FILM COATED ORAL DAILY
Qty: 90 TABLET | Refills: 1 | Status: SHIPPED | OUTPATIENT
Start: 2022-09-30

## 2022-10-01 NOTE — TELEPHONE ENCOUNTER
Callled in  Please why pharmacy keeps telling our patients this   Something might be wrong with EpIc

## 2022-10-26 ENCOUNTER — TELEPHONE (OUTPATIENT)
Dept: FAMILY MEDICINE CLINIC | Facility: CLINIC | Age: 22
End: 2022-10-26

## 2022-10-26 DIAGNOSIS — Z30.019 ENCOUNTER FOR FEMALE BIRTH CONTROL: Primary | ICD-10-CM

## 2022-10-26 RX ORDER — MEDROXYPROGESTERONE ACETATE 150 MG/ML
150 INJECTION, SUSPENSION INTRAMUSCULAR
Qty: 1 ML | Refills: 6 | Status: SHIPPED | OUTPATIENT
Start: 2022-10-26 | End: 2023-03-31 | Stop reason: SDUPTHER

## 2022-10-26 NOTE — TELEPHONE ENCOUNTER
Patient's mom says depo injection was cancelled but she needs refill sent to [facility]  Please call patient's mom when med is sent

## 2022-10-28 ENCOUNTER — CLINICAL SUPPORT (OUTPATIENT)
Dept: FAMILY MEDICINE CLINIC | Facility: CLINIC | Age: 22
End: 2022-10-28
Payer: COMMERCIAL

## 2022-10-28 DIAGNOSIS — Z30.9 ENCOUNTER FOR CONTRACEPTIVE MANAGEMENT, UNSPECIFIED TYPE: Primary | ICD-10-CM

## 2022-10-28 RX ORDER — MEDROXYPROGESTERONE ACETATE 150 MG/ML
150 INJECTION, SUSPENSION INTRAMUSCULAR ONCE
Status: COMPLETED | OUTPATIENT
Start: 2022-10-28 | End: 2022-10-28

## 2022-10-28 RX ADMIN — MEDROXYPROGESTERONE ACETATE 150 MG: 150 INJECTION, SUSPENSION INTRAMUSCULAR at 09:40

## 2022-11-02 DIAGNOSIS — F41.9 ANXIETY: ICD-10-CM

## 2022-11-02 RX ORDER — HYDROXYZINE HYDROCHLORIDE 25 MG/1
25 TABLET, FILM COATED ORAL 3 TIMES DAILY PRN
Qty: 15 TABLET | Refills: 0 | Status: SHIPPED | OUTPATIENT
Start: 2022-11-02

## 2022-11-02 RX ORDER — PAROXETINE 10 MG/1
10 TABLET, FILM COATED ORAL DAILY
Qty: 90 TABLET | Refills: 1 | Status: SHIPPED | OUTPATIENT
Start: 2022-11-02

## 2023-01-04 DIAGNOSIS — F41.9 ANXIETY: ICD-10-CM

## 2023-01-04 RX ORDER — HYDROXYZINE HYDROCHLORIDE 25 MG/1
TABLET, FILM COATED ORAL
Qty: 15 TABLET | Refills: 0 | Status: SHIPPED | OUTPATIENT
Start: 2023-01-04

## 2023-01-20 ENCOUNTER — CLINICAL SUPPORT (OUTPATIENT)
Dept: FAMILY MEDICINE CLINIC | Facility: CLINIC | Age: 23
End: 2023-01-20

## 2023-01-20 DIAGNOSIS — Z30.9 ENCOUNTER FOR CONTRACEPTIVE MANAGEMENT, UNSPECIFIED TYPE: Primary | ICD-10-CM

## 2023-01-20 RX ORDER — MEDROXYPROGESTERONE ACETATE 150 MG/ML
150 INJECTION, SUSPENSION INTRAMUSCULAR ONCE
Status: COMPLETED | OUTPATIENT
Start: 2023-01-20 | End: 2023-01-20

## 2023-01-20 RX ADMIN — MEDROXYPROGESTERONE ACETATE 150 MG: 150 INJECTION, SUSPENSION INTRAMUSCULAR at 09:09

## 2023-03-16 DIAGNOSIS — F41.9 ANXIETY: ICD-10-CM

## 2023-03-17 RX ORDER — HYDROXYZINE HYDROCHLORIDE 25 MG/1
TABLET, FILM COATED ORAL
Qty: 15 TABLET | Refills: 0 | Status: SHIPPED | OUTPATIENT
Start: 2023-03-17

## 2023-03-31 ENCOUNTER — OFFICE VISIT (OUTPATIENT)
Dept: FAMILY MEDICINE CLINIC | Facility: CLINIC | Age: 23
End: 2023-03-31

## 2023-03-31 VITALS
SYSTOLIC BLOOD PRESSURE: 124 MMHG | WEIGHT: 189 LBS | BODY MASS INDEX: 33.49 KG/M2 | RESPIRATION RATE: 16 BRPM | HEIGHT: 63 IN | HEART RATE: 105 BPM | OXYGEN SATURATION: 99 % | TEMPERATURE: 98.6 F | DIASTOLIC BLOOD PRESSURE: 80 MMHG

## 2023-03-31 DIAGNOSIS — F41.9 ANXIETY: Primary | ICD-10-CM

## 2023-03-31 DIAGNOSIS — Z30.019 ENCOUNTER FOR FEMALE BIRTH CONTROL: ICD-10-CM

## 2023-03-31 RX ORDER — MEDROXYPROGESTERONE ACETATE 150 MG/ML
150 INJECTION, SUSPENSION INTRAMUSCULAR
Qty: 1 ML | Refills: 6 | Status: SHIPPED | OUTPATIENT
Start: 2023-03-31

## 2023-03-31 RX ORDER — PAROXETINE 10 MG/1
10 TABLET, FILM COATED ORAL DAILY
Qty: 90 TABLET | Refills: 1 | Status: SHIPPED | OUTPATIENT
Start: 2023-03-31

## 2023-03-31 RX ORDER — HYDROXYZINE HYDROCHLORIDE 25 MG/1
25 TABLET, FILM COATED ORAL 3 TIMES DAILY PRN
Qty: 30 TABLET | Refills: 6 | Status: SHIPPED | OUTPATIENT
Start: 2023-03-31

## 2023-03-31 NOTE — PROGRESS NOTES
Mark Vasquez 2000 female MRN: [id number]    [address] [place] [address]      ASSESSMENT/PLAN  Mark Vasquez is a 23 y o  female presents to the office for    Diagnoses and all orders for this visit:    Anxiety  -     PARoxetine (PAXIL) 10 mg tablet; Take 1 tablet (10 mg total) by mouth daily  -     hydrOXYzine HCL (ATARAX) 25 mg tablet; Take 1 tablet (25 mg total) by mouth 3 (three) times a day as needed for anxiety  -     Comprehensive metabolic panel; Future  -     Comprehensive metabolic panel    Encounter for female birth control  -     medroxyPROGESTERone (DEPO-PROVERA) 150 mg/mL injection; Inject 1 mL (150 mg total) into a muscle every 3 (three) months  Patient's anxiety is currently well controlled no changes to be made today  Refilled Depo Provera for the patient             No future appointments  SUBJECTIVE  CC: Follow-up (medication)      HPI:  Mark Vasquez is a 23 y o  female who presents for a follow-up appointment  Patient is seeing us every 6 months for management of her anxiety  Since being on Paxil the patient's anxiety has been extremely well controlled  Patient believes she does not need any adjustments today  Patient Marcello Jose any acute concerns  Tolerating Depo  Review of Systems   Constitutional: Negative for activity change, appetite change, chills, fatigue and fever  HENT: Negative for congestion  Respiratory: Negative for cough, chest tightness and shortness of breath  Cardiovascular: Negative for chest pain and leg swelling  Gastrointestinal: Negative for abdominal distention, abdominal pain, constipation, diarrhea, nausea and vomiting  All other systems reviewed and are negative        Historical Information   The patient history was reviewed as follows:  Past Medical History:   Diagnosis Date   • Allergic    • Heart murmur    • Tachycardia 12/14/2018         Medications:     Current Outpatient "Medications:   •  hydrOXYzine HCL (ATARAX) 25 mg tablet, Take 1 tablet (25 mg total) by mouth 3 (three) times a day as needed for anxiety, Disp: 30 tablet, Rfl: 6  •  medroxyPROGESTERone (DEPO-PROVERA) 150 mg/mL injection, Inject 1 mL (150 mg total) into a muscle every 3 (three) months, Disp: 1 mL, Rfl: 6  •  PARoxetine (PAXIL) 10 mg tablet, Take 1 tablet (10 mg total) by mouth daily, Disp: 90 tablet, Rfl: 1    No Known Allergies    OBJECTIVE  Vitals:   Vitals:    03/31/23 1006   BP: 124/80   BP Location: Left arm   Patient Position: Sitting   Cuff Size: Large   Pulse: 105   Resp: 16   Temp: 98 6 °F (37 °C)   SpO2: 99%   Weight: 85 7 kg (189 lb)   Height: 5' 2 5\" (1 588 m)         Physical Exam  Vitals reviewed  Constitutional:       Appearance: She is well-developed  HENT:      Head: Normocephalic and atraumatic  Eyes:      Conjunctiva/sclera: Conjunctivae normal       Pupils: Pupils are equal, round, and reactive to light  Cardiovascular:      Rate and Rhythm: Normal rate and regular rhythm  Heart sounds: Normal heart sounds  Pulmonary:      Effort: Pulmonary effort is normal  No respiratory distress  Breath sounds: Normal breath sounds  Musculoskeletal:         General: Normal range of motion  Cervical back: Normal range of motion and neck supple  Skin:     General: Skin is warm  Capillary Refill: Capillary refill takes less than 2 seconds  Neurological:      Mental Status: She is alert and oriented to person, place, and time                      Elliot Kingsley MD,   [facility]  4/2/2023      "

## 2023-05-30 ENCOUNTER — TELEPHONE (OUTPATIENT)
Dept: FAMILY MEDICINE CLINIC | Facility: CLINIC | Age: 23
End: 2023-05-30

## 2023-05-30 NOTE — TELEPHONE ENCOUNTER
Hi, yes, my name is Joe Zaragoza  I'm calling in regards to my daughter Joan Epley  Her date of birth is [birth year]  If someone could give me a call back on my cell phone at [phone]  Thank you

## 2023-05-30 NOTE — TELEPHONE ENCOUNTER
Called patient's mom  She received jury duty form which she will drop off for Dr Lefty Alarcon as patient has learning disability

## 2023-06-07 ENCOUNTER — TELEPHONE (OUTPATIENT)
Dept: FAMILY MEDICINE CLINIC | Facility: CLINIC | Age: 23
End: 2023-06-07

## 2023-06-07 NOTE — TELEPHONE ENCOUNTER
Dr Rebecca Varela:    Patient's mother dropped off [employer] Duty excuse forms for your review and signature  Please advise when form is ready for pickup

## 2023-06-09 NOTE — TELEPHONE ENCOUNTER
Its not her father, its her mothers Boyfriend  Will state it  But I don't know if it will be OK to exempt her for a year  Will see if we get approved for 6 months   Ready for

## 2023-06-09 NOTE — TELEPHONE ENCOUNTER
Just curious why she needs an excuse she is 22? Is she working? I don't know if this is going to be approved   Max maybe 3 months

## 2023-07-07 ENCOUNTER — CLINICAL SUPPORT (OUTPATIENT)
Dept: FAMILY MEDICINE CLINIC | Facility: CLINIC | Age: 23
End: 2023-07-07
Payer: COMMERCIAL

## 2023-07-07 DIAGNOSIS — Z30.8 ENCOUNTER FOR OTHER CONTRACEPTIVE MANAGEMENT: Primary | ICD-10-CM

## 2023-07-07 PROCEDURE — 96372 THER/PROPH/DIAG INJ SC/IM: CPT

## 2023-07-07 RX ORDER — MEDROXYPROGESTERONE ACETATE 150 MG/ML
150 INJECTION, SUSPENSION INTRAMUSCULAR ONCE
Status: COMPLETED | OUTPATIENT
Start: 2023-07-07 | End: 2023-07-07

## 2023-07-07 RX ADMIN — MEDROXYPROGESTERONE ACETATE 150 MG: 150 INJECTION, SUSPENSION INTRAMUSCULAR at 15:17

## 2023-08-15 DIAGNOSIS — F41.9 ANXIETY: ICD-10-CM

## 2023-08-15 LAB
ALBUMIN SERPL-MCNC: 4.5 G/DL (ref 4–5)
ALBUMIN/GLOB SERPL: 2.1 {RATIO} (ref 1.2–2.2)
ALP SERPL-CCNC: 102 IU/L (ref 42–106)
ALT SERPL-CCNC: 20 IU/L (ref 0–32)
AST SERPL-CCNC: 17 IU/L (ref 0–40)
BILIRUB SERPL-MCNC: 0.3 MG/DL (ref 0–1.2)
BUN SERPL-MCNC: 7 MG/DL (ref 6–20)
BUN/CREAT SERPL: 8 (ref 9–23)
CALCIUM SERPL-MCNC: 9.6 MG/DL (ref 8.7–10.2)
CHLORIDE SERPL-SCNC: 106 MMOL/L (ref 96–106)
CO2 SERPL-SCNC: 20 MMOL/L (ref 20–29)
CREAT SERPL-MCNC: 0.86 MG/DL (ref 0.57–1)
EGFR: 99 ML/MIN/1.73
GLOBULIN SER-MCNC: 2.1 G/DL (ref 1.5–4.5)
GLUCOSE SERPL-MCNC: 105 MG/DL (ref 70–99)
POTASSIUM SERPL-SCNC: 4.3 MMOL/L (ref 3.5–5.2)
PROT SERPL-MCNC: 6.6 G/DL (ref 6–8.5)
SODIUM SERPL-SCNC: 141 MMOL/L (ref 134–144)

## 2023-08-15 RX ORDER — PAROXETINE 10 MG/1
10 TABLET, FILM COATED ORAL DAILY
Qty: 90 TABLET | Refills: 3 | Status: SHIPPED | OUTPATIENT
Start: 2023-08-15

## 2023-08-22 ENCOUNTER — TELEMEDICINE (OUTPATIENT)
Dept: FAMILY MEDICINE CLINIC | Facility: CLINIC | Age: 23
End: 2023-08-22
Payer: COMMERCIAL

## 2023-08-22 VITALS — HEIGHT: 63 IN | TEMPERATURE: 98.2 F | BODY MASS INDEX: 34.02 KG/M2

## 2023-08-22 DIAGNOSIS — K05.10 GUM INFLAMMATION: Primary | ICD-10-CM

## 2023-08-22 DIAGNOSIS — F41.9 ANXIETY: ICD-10-CM

## 2023-08-22 PROCEDURE — 99213 OFFICE O/P EST LOW 20 MIN: CPT | Performed by: FAMILY MEDICINE

## 2023-08-22 RX ORDER — AMOXICILLIN 500 MG/1
500 CAPSULE ORAL EVERY 12 HOURS SCHEDULED
Qty: 20 CAPSULE | Refills: 0 | Status: SHIPPED | OUTPATIENT
Start: 2023-08-22 | End: 2023-09-01

## 2023-08-22 NOTE — PROGRESS NOTES
Virtual Regular Visit    Verification of patient location:    Patient is located at Home in the following state in which I hold an active license [place]      Assessment/Plan:    Problem List Items Addressed This Visit    None  Visit Diagnoses     Gum inflammation    -  Primary    Relevant Medications    amoxicillin (AMOXIL) 500 mg capsule    Anxiety            Recommend starting on amoxicillin twice a day for total of 10 days  If there is no improvement highly recommend that the patient see a dentist given the right swelling of the right cheek  Anxiety well controlled on Paxil at this time, has not been needing to use her rescue       Reason for visit is   Chief Complaint   Patient presents with   • abcess     Upper tooth abscess ,started 2 days ago    • Virtual Regular Visit        Encounter provider John Fields MD    Provider located at [address] [place] [id number]      Recent Visits  No visits were found meeting these conditions. Showing recent visits within past 7 days and meeting all other requirements  Today's Visits  Date Type Provider Dept   08/22/23 Telemedicine John Fields MD [id number] [id number] today's visits and meeting all other requirements  Future Appointments  No visits were found meeting these conditions. Showing future appointments within next 150 days and meeting all other requirements       The patient was identified by name and date of birth. Deb Lewis was informed that this is a telemedicine visit and that the visit is being conducted through the [address] [place] [address] Now platform. She agrees to proceed. .  My office door was closed. No one else was in the room. She acknowledged consent and understanding of privacy and security of the video platform. The patient has agreed to participate and understands they can discontinue the visit at any time. Patient is aware this is a billable service.      Subjective  Romulo Ortega Pat Vee is a 23 y.o. female Right sided gum infection. Started a couple days ago. No fevers. Able to swallow food ok. Right side of the swallowen. HPI     Past Medical History:   Diagnosis Date   • Allergic    • Heart murmur    • Tachycardia 12/14/2018       History reviewed. No pertinent surgical history. Current Outpatient Medications   Medication Sig Dispense Refill   • amoxicillin (AMOXIL) 500 mg capsule Take 1 capsule (500 mg total) by mouth every 12 (twelve) hours for 10 days 20 capsule 0   • hydrOXYzine HCL (ATARAX) 25 mg tablet Take 1 tablet (25 mg total) by mouth 3 (three) times a day as needed for anxiety 30 tablet 6   • medroxyPROGESTERone (DEPO-PROVERA) 150 mg/mL injection Inject 1 mL (150 mg total) into a muscle every 3 (three) months 1 mL 6   • PARoxetine (PAXIL) 10 mg tablet Take 1 tablet (10 mg total) by mouth daily 90 tablet 3     No current facility-administered medications for this visit. No Known Allergies    Review of Systems  See HPI Mr.  Video Exam    Vitals:    08/22/23 1001   Temp: 98.2 °F (36.8 °C)   Height: 5' 2.5" (1.588 m)       Physical Exam  Vitals reviewed. Constitutional:       Appearance: She is well-developed. HENT:      Head: Normocephalic and atraumatic. Eyes:      Conjunctiva/sclera: Conjunctivae normal.      Pupils: Pupils are equal, round, and reactive to light. Cardiovascular:      Rate and Rhythm: Normal rate and regular rhythm. Heart sounds: Normal heart sounds. Pulmonary:      Effort: Pulmonary effort is normal. No respiratory distress. Breath sounds: Normal breath sounds. Musculoskeletal:         General: Normal range of motion. Cervical back: Normal range of motion and neck supple. Skin:     General: Skin is warm. Capillary Refill: Capillary refill takes less than 2 seconds.       Comments: Swelling of the right face, and redness and inflammation of upper Gum   Neurological:      Mental Status: She is alert and oriented to person, place, and time.           Visit Time  Total Visit Duration: 10

## 2023-10-03 ENCOUNTER — CLINICAL SUPPORT (OUTPATIENT)
Dept: FAMILY MEDICINE CLINIC | Facility: CLINIC | Age: 23
End: 2023-10-03
Payer: COMMERCIAL

## 2023-10-03 DIAGNOSIS — Z30.8 ENCOUNTER FOR OTHER CONTRACEPTIVE MANAGEMENT: Primary | ICD-10-CM

## 2023-10-03 PROCEDURE — 96372 THER/PROPH/DIAG INJ SC/IM: CPT

## 2023-10-03 RX ORDER — MEDROXYPROGESTERONE ACETATE 150 MG/ML
150 INJECTION, SUSPENSION INTRAMUSCULAR ONCE
Status: COMPLETED | OUTPATIENT
Start: 2023-10-03 | End: 2023-10-03

## 2023-10-03 RX ADMIN — MEDROXYPROGESTERONE ACETATE 150 MG: 150 INJECTION, SUSPENSION INTRAMUSCULAR at 09:37

## 2023-11-16 ENCOUNTER — HOSPITAL ENCOUNTER (EMERGENCY)
Facility: HOSPITAL | Age: 23
Discharge: HOME | End: 2023-11-16
Attending: EMERGENCY MEDICINE | Admitting: EMERGENCY MEDICINE
Payer: COMMERCIAL

## 2023-11-16 VITALS
BODY MASS INDEX: 33.41 KG/M2 | DIASTOLIC BLOOD PRESSURE: 77 MMHG | TEMPERATURE: 96.9 F | RESPIRATION RATE: 18 BRPM | SYSTOLIC BLOOD PRESSURE: 137 MMHG | OXYGEN SATURATION: 99 % | HEART RATE: 94 BPM | WEIGHT: 185.6 LBS

## 2023-11-16 DIAGNOSIS — H60.91 RIGHT OTITIS EXTERNA: Primary | ICD-10-CM

## 2023-11-16 PROCEDURE — 99284 EMERGENCY DEPT VISIT MOD MDM: CPT | Performed by: EMERGENCY MEDICINE

## 2023-11-16 PROCEDURE — 99283 EMERGENCY DEPT VISIT LOW MDM: CPT

## 2023-11-16 RX ORDER — NEOMYCIN SULFATE, POLYMYXIN B SULFATE AND HYDROCORTISONE 10; 3.5; 1 MG/ML; MG/ML; [USP'U]/ML
2 SUSPENSION/ DROPS AURICULAR (OTIC) ONCE
Status: COMPLETED | OUTPATIENT
Start: 2023-11-16 | End: 2023-11-16

## 2023-11-16 RX ADMIN — NEOMYCIN SULFATE, POLYMYXIN B SULFATE AND HYDROCORTISONE 2 DROP: 10; 3.5; 1 SUSPENSION/ DROPS AURICULAR (OTIC) at 21:27

## 2023-11-17 NOTE — ED PROVIDER NOTES
History  Chief Complaint   Patient presents with    Earache     States about 20 minutes ago was cleaning left ear with cotton tip and thinks went in too far, has pain      27 yo female c/o left ear pain after sticking a Qtip inside to clean it. Occurred just prior to arrival.  No pain or symptoms before the Qtip. No recent fever, cough, congestion, vomiting, diarrhea. History provided by:  Patient   used: No    Earache  Associated symptoms: no congestion, no cough, no diarrhea, no fever and no vomiting        Prior to Admission Medications   Prescriptions Last Dose Informant Patient Reported? Taking? PARoxetine (PAXIL) 10 mg tablet   No No   Sig: Take 1 tablet (10 mg total) by mouth daily   hydrOXYzine HCL (ATARAX) 25 mg tablet   No No   Sig: Take 1 tablet (25 mg total) by mouth 3 (three) times a day as needed for anxiety   medroxyPROGESTERone (DEPO-PROVERA) 150 mg/mL injection   No No   Sig: Inject 1 mL (150 mg total) into a muscle every 3 (three) months      Facility-Administered Medications: None       Past Medical History:   Diagnosis Date    Allergic     Heart murmur     Tachycardia 12/14/2018       History reviewed. No pertinent surgical history. Family History   Problem Relation Age of Onset    Asthma Mother     Diabetes Maternal Grandmother     Cancer Maternal Aunt      I have reviewed and agree with the history as documented. E-Cigarette/Vaping    E-Cigarette Use Never User      E-Cigarette/Vaping Substances    Nicotine No     THC No     CBD No     Flavoring No     Other No     Unknown No      Social History     Tobacco Use    Smoking status: Never    Smokeless tobacco: Never    Tobacco comments:     vapes   Vaping Use    Vaping Use: Never used   Substance Use Topics    Alcohol use: No    Drug use: Yes     Frequency: 7.0 times per week     Types: Marijuana     Comment: uses daily       Review of Systems   Constitutional:  Negative for fever. HENT:  Positive for ear pain. Negative for congestion. Respiratory:  Negative for cough. Gastrointestinal:  Negative for diarrhea and vomiting. Physical Exam  Physical Exam  Vitals and nursing note reviewed. Constitutional:       General: She is not in acute distress. Appearance: She is not ill-appearing. HENT:      Head: Normocephalic and atraumatic. Right Ear: Tympanic membrane and ear canal normal.      Ears:      Comments: Left TM with small central vascular area, mild canal redness, no TM perforation, no bleeding     Mouth/Throat:      Mouth: Mucous membranes are moist.      Pharynx: Oropharynx is clear. Eyes:      General: No scleral icterus. Conjunctiva/sclera: Conjunctivae normal.   Cardiovascular:      Rate and Rhythm: Normal rate and regular rhythm. Heart sounds: Normal heart sounds. Pulmonary:      Effort: Pulmonary effort is normal. No respiratory distress. Breath sounds: Normal breath sounds. Musculoskeletal:         General: No deformity. Normal range of motion. Cervical back: Normal range of motion and neck supple. Skin:     General: Skin is warm and dry. Neurological:      General: No focal deficit present. Mental Status: She is alert and oriented to person, place, and time.    Psychiatric:         Mood and Affect: Mood normal.         Behavior: Behavior normal.         Vital Signs  ED Triage Vitals [11/16/23 2112]   Temperature Pulse Respirations Blood Pressure SpO2   (!) 96.9 °F (36.1 °C) 94 18 137/77 99 %      Temp Source Heart Rate Source Patient Position - Orthostatic VS BP Location FiO2 (%)   Tympanic Monitor Sitting Left arm --      Pain Score       No Pain           Vitals:    11/16/23 2112   BP: 137/77   Pulse: 94   Patient Position - Orthostatic VS: Sitting         Visual Acuity      ED Medications  Medications   neomycin-polymyxin-hydrocortisone (CORTISPORIN) 0.35%-10,000 units/mL-1% otic suspension 2 drop (has no administration in time range)       Diagnostic Studies  Results Reviewed       None                   No orders to display              Procedures  Procedures         ED Course                               SBIRT 22yo+      Flowsheet Row Most Recent Value   Initial Alcohol Screen: US AUDIT-C     1. How often do you have a drink containing alcohol? 0 Filed at: 11/16/2023 2122   2. How many drinks containing alcohol do you have on a typical day you are drinking? 0 Filed at: 11/16/2023 2122   3b. FEMALE Any Age, or MALE 65+: How often do you have 4 or more drinks on one occassion? 0 Filed at: 11/16/2023 2122   Audit-C Score 0 Filed at: 11/16/2023 2122   RASHID: How many times in the past year have you. .. Used an illegal drug or used a prescription medication for non-medical reasons? Never Filed at: 11/16/2023 2122                      Medical Decision Making  Advised ear drops and follow up if any problems. Risk  Prescription drug management. Disposition  Final diagnoses:   Right otitis externa     Time reflects when diagnosis was documented in both MDM as applicable and the Disposition within this note       Time User Action Codes Description Comment    80/71/5688  7:94 PM Mohini Bey Add [L26.02] Right otitis externa           ED Disposition       ED Disposition   Discharge    Condition   Stable    Date/Time   Thu Nov 16, 2023 2122    [facility] discharge to home/self care. Follow-up Information       Follow up With Specialties Details Why Mary Jane Pablo MD Family Medicine  As needed [address] [place] [address]  [phone]              Patient's Medications   Discharge Prescriptions    No medications on file       No discharge procedures on file.     PDMP Review       None            ED Provider  Electronically Signed by             Jailyn Capellan MD  29/62/15 0830

## 2023-11-17 NOTE — DISCHARGE INSTRUCTIONS
Use 2 drops in the left ear 4 times a day. Tylenol and/or Advil as needed for discomfort. No Qtips inside the ear.

## 2023-11-20 ENCOUNTER — OFFICE VISIT (OUTPATIENT)
Dept: FAMILY MEDICINE CLINIC | Facility: CLINIC | Age: 23
End: 2023-11-20
Payer: COMMERCIAL

## 2023-11-20 VITALS
SYSTOLIC BLOOD PRESSURE: 122 MMHG | HEIGHT: 63 IN | HEART RATE: 92 BPM | RESPIRATION RATE: 14 BRPM | BODY MASS INDEX: 32.78 KG/M2 | TEMPERATURE: 98.6 F | WEIGHT: 185 LBS | DIASTOLIC BLOOD PRESSURE: 80 MMHG

## 2023-11-20 DIAGNOSIS — F41.9 ANXIETY: ICD-10-CM

## 2023-11-20 DIAGNOSIS — H92.02 LEFT EAR PAIN: Primary | ICD-10-CM

## 2023-11-20 DIAGNOSIS — Z30.019 ENCOUNTER FOR FEMALE BIRTH CONTROL: ICD-10-CM

## 2023-11-20 PROCEDURE — 99214 OFFICE O/P EST MOD 30 MIN: CPT | Performed by: FAMILY MEDICINE

## 2023-11-20 RX ORDER — MEDROXYPROGESTERONE ACETATE 150 MG/ML
150 INJECTION, SUSPENSION INTRAMUSCULAR
Qty: 1 ML | Refills: 6 | Status: SHIPPED | OUTPATIENT
Start: 2023-11-20

## 2023-11-20 RX ORDER — AMOXICILLIN 500 MG/1
500 CAPSULE ORAL EVERY 12 HOURS SCHEDULED
Qty: 20 CAPSULE | Refills: 0 | Status: SHIPPED | OUTPATIENT
Start: 2023-11-20 | End: 2023-11-30

## 2023-11-20 NOTE — PROGRESS NOTES
Alysa Quiñones 2000 female MRN: [id number]    [address]      ASSESSMENT/PLAN  Alysa Quiñones is a 26 y.o. female presents to the office for    Diagnoses and all orders for this visit:    Left ear pain  -     amoxicillin (AMOXIL) 500 mg capsule; Take 1 capsule (500 mg total) by mouth every 12 (twelve) hours for 10 days    Anxiety    Encounter for female birth control  -     medroxyPROGESTERone (DEPO-PROVERA) 150 mg/mL injection; Inject 1 mL (150 mg total) into a muscle every 3 (three) months       Unfortunately patient has otitis media on top of otitis externa. Continue eardrops that were provided by the emergency room however we will add on amoxicillin 500 mg twice a day for 10 days. Patient is very well controlled on her anxiety medications continue Paxil daily. Atarax to be used only as needed patient rarely uses this. Currently well controlled on the Depo patient would like to discuss about getting off of it in the future. We will hold till she is ready to talk  BMI Counseling: Body mass index is 33.3 kg/m². The BMI is above normal. Exercise recommendations include exercising 3-5 times per week. Rationale for BMI follow-up plan is due to patient being overweight or obese. Future Appointments   Date Time Provider [address]   12/18/2023 10:45 AM Lorenzo Palencia MD [facility]          SUBJECTIVE  CC: Earache (Left ear pain)      HPI:  Alysa Quiñones is a 26 y.o. female who presents for an acute appointment. Patient presenting after being seen at the emergency room states that the drops are only helping minimally states that she continues to have a clogged ear and does not hear very well from that ear. Denies any fevers or chills at this time patient states she has been taking her anxiety medications without any side effects feels like its been very helpful. needs a refill of Depo Review of Systems   Constitutional:  Negative for activity change, appetite change, chills, fatigue and fever. HENT:  Positive for ear pain. Negative for congestion. Respiratory:  Negative for cough, chest tightness and shortness of breath. Cardiovascular:  Negative for chest pain and leg swelling. Gastrointestinal:  Negative for abdominal distention, abdominal pain, constipation, diarrhea, nausea and vomiting. All other systems reviewed and are negative. Historical Information   The patient history was reviewed as follows:  Past Medical History:   Diagnosis Date   • Allergic    • Heart murmur    • Tachycardia 12/14/2018         Medications:     Current Outpatient Medications:   •  amoxicillin (AMOXIL) 500 mg capsule, Take 1 capsule (500 mg total) by mouth every 12 (twelve) hours for 10 days, Disp: 20 capsule, Rfl: 0  •  hydrOXYzine HCL (ATARAX) 25 mg tablet, Take 1 tablet (25 mg total) by mouth 3 (three) times a day as needed for anxiety, Disp: 30 tablet, Rfl: 6  •  medroxyPROGESTERone (DEPO-PROVERA) 150 mg/mL injection, Inject 1 mL (150 mg total) into a muscle every 3 (three) months, Disp: 1 mL, Rfl: 6  •  PARoxetine (PAXIL) 10 mg tablet, Take 1 tablet (10 mg total) by mouth daily, Disp: 90 tablet, Rfl: 3    No Known Allergies    OBJECTIVE  Vitals:   Vitals:    11/20/23 0843   BP: 122/80   BP Location: Left arm   Patient Position: Sitting   Cuff Size: Adult   Pulse: 92   Resp: 14   Temp: 98.6 °F (37 °C)   TempSrc: Temporal   Weight: 83.9 kg (185 lb)   Height: 5' 2.5" (1.588 m)         Physical Exam  Vitals reviewed. Constitutional:       Appearance: She is well-developed. HENT:      Head: Normocephalic and atraumatic. Right Ear: Tympanic membrane is erythematous and bulging. Eyes:      Conjunctiva/sclera: Conjunctivae normal.      Pupils: Pupils are equal, round, and reactive to light. Cardiovascular:      Rate and Rhythm: Normal rate and regular rhythm. Heart sounds: Normal heart sounds. Pulmonary:      Effort: Pulmonary effort is normal. No respiratory distress. Breath sounds: Normal breath sounds. Musculoskeletal:         General: Normal range of motion. Cervical back: Normal range of motion and neck supple. Skin:     General: Skin is warm. Capillary Refill: Capillary refill takes less than 2 seconds. Neurological:      Mental Status: She is alert and oriented to person, place, and time.                     Claudell Mulders, MD,   [facility]  11/21/2023

## 2023-12-18 ENCOUNTER — OFFICE VISIT (OUTPATIENT)
Dept: FAMILY MEDICINE CLINIC | Facility: CLINIC | Age: 23
End: 2023-12-18
Payer: COMMERCIAL

## 2023-12-18 VITALS
DIASTOLIC BLOOD PRESSURE: 70 MMHG | OXYGEN SATURATION: 99 % | BODY MASS INDEX: 32.96 KG/M2 | SYSTOLIC BLOOD PRESSURE: 102 MMHG | WEIGHT: 186 LBS | RESPIRATION RATE: 16 BRPM | HEART RATE: 90 BPM | TEMPERATURE: 98.4 F | HEIGHT: 63 IN

## 2023-12-18 DIAGNOSIS — F41.9 ANXIETY: ICD-10-CM

## 2023-12-18 DIAGNOSIS — R05.9 COUGH, UNSPECIFIED TYPE: ICD-10-CM

## 2023-12-18 DIAGNOSIS — Z00.00 PHYSICAL EXAM: Primary | ICD-10-CM

## 2023-12-18 PROCEDURE — 99395 PREV VISIT EST AGE 18-39: CPT | Performed by: FAMILY MEDICINE

## 2023-12-18 RX ORDER — AZITHROMYCIN 250 MG/1
TABLET, FILM COATED ORAL
Qty: 6 TABLET | Refills: 0 | Status: SHIPPED | OUTPATIENT
Start: 2023-12-18 | End: 2023-12-22

## 2023-12-18 RX ORDER — METHYLPREDNISOLONE 4 MG/1
TABLET ORAL
Qty: 21 EACH | Refills: 0 | Status: SHIPPED | OUTPATIENT
Start: 2023-12-18

## 2023-12-18 RX ORDER — PAROXETINE 10 MG/1
10 TABLET, FILM COATED ORAL DAILY
Qty: 90 TABLET | Refills: 3 | Status: SHIPPED | OUTPATIENT
Start: 2023-12-18 | End: 2023-12-24 | Stop reason: SDUPTHER

## 2023-12-18 RX ORDER — HYDROXYZINE HYDROCHLORIDE 25 MG/1
25 TABLET, FILM COATED ORAL 3 TIMES DAILY PRN
Qty: 30 TABLET | Refills: 6 | Status: SHIPPED | OUTPATIENT
Start: 2023-12-18

## 2023-12-18 NOTE — PROGRESS NOTES
ADULT ANNUAL PHYSICAL  [facility]    NAME: Bart Sosa  AGE: 23 y.o. SEX: female  : 2000     DATE: 2023     Assessment and Plan:     Problem List Items Addressed This Visit    None  Visit Diagnoses       Physical exam    -  Primary    Anxiety        Relevant Medications    PARoxetine (PAXIL) 10 mg tablet    hydrOXYzine HCL (ATARAX) 25 mg tablet    Cough, unspecified type        Relevant Medications    methylPREDNISolone 4 MG tablet therapy pack    azithromycin (ZITHROMAX) 250 mg tablet          from a physical standpoint the patient is doing very well continue medications as prescribed for anxiety.    Medrol pack/Z-Bertrand started advised I would like her to start the Medrol pack for now and if she worsens over the weekend to start the Z-Bertrand.  This was likely bronchitis at 1 point.         No follow-ups on file.     Chief Complaint:     Chief Complaint   Patient presents with    Physical Exam      History of Present Illness:     Adult Annual Physical   Patient here for a comprehensive physical exam.  Patient has had a persisting cough that she has been worsening and would like evaluation.  Patient also states that anxiety standpoint she has been doing very good no changes to be made with the medications.      Diet and Physical Activity  Diet/Nutrition: well balanced diet.   Exercise: no formal exercise.      Depression Screening  PHQ-2/9 Depression Screening           General Health  Sleep: sleeps well.   Hearing: normal - bilateral.  Vision: no vision problems.   Dental: regular dental visits.       /GYN Health  Depo shot currently stable     Review of Systems:     Review of Systems   Constitutional:  Negative for activity change, appetite change, chills, fatigue and fever.   HENT:  Negative for congestion.    Respiratory:  Positive for cough and shortness of breath. Negative for chest tightness.    Cardiovascular:  Negative for chest pain and  leg swelling.   Gastrointestinal:  Negative for abdominal distention, abdominal pain, constipation, diarrhea, nausea and vomiting.   All other systems reviewed and are negative.     Past Medical History:     Past Medical History:   Diagnosis Date    Allergic     Heart murmur     Tachycardia 12/14/2018      Past Surgical History:     No past surgical history on file.   Social History:     Social History     Socioeconomic History    Marital status: Single     Spouse name: None    Number of children: None    Years of education: None    Highest education level: None   Occupational History    None   Tobacco Use    Smoking status: Never    Smokeless tobacco: Never    Tobacco comments:     vapes   Vaping Use    Vaping status: Never Used   Substance and Sexual Activity    Alcohol use: No    Drug use: Yes     Frequency: 7.0 times per week     Types: Marijuana     Comment: uses daily    Sexual activity: Yes     Partners: Male   Other Topics Concern    None   Social History Narrative    None     Social Determinants of Health     Financial Resource Strain: Not on file   Food Insecurity: Not on file   Transportation Needs: Not on file   Physical Activity: Not on file   Stress: Not on file   Social Connections: Not on file   Intimate Partner Violence: Not on file   Housing Stability: Not on file      Family History:     Family History   Problem Relation Age of Onset    Asthma Mother     Diabetes Maternal Grandmother     Cancer Maternal Aunt       Current Medications:     Current Outpatient Medications   Medication Sig Dispense Refill    azithromycin (ZITHROMAX) 250 mg tablet Take 2 tablets today then 1 tablet daily x 4 days 6 tablet 0    hydrOXYzine HCL (ATARAX) 25 mg tablet Take 1 tablet (25 mg total) by mouth 3 (three) times a day as needed for anxiety 30 tablet 6    medroxyPROGESTERone (DEPO-PROVERA) 150 mg/mL injection Inject 1 mL (150 mg total) into a muscle every 3 (three) months 1 mL 6    methylPREDNISolone 4 MG tablet  "therapy pack Use as directed on package 21 each 0    PARoxetine (PAXIL) 10 mg tablet Take 1 tablet (10 mg total) by mouth daily 90 tablet 3     No current facility-administered medications for this visit.      Allergies:     No Known Allergies   Physical Exam:     /70 (BP Location: Left arm, Patient Position: Sitting, Cuff Size: Large)   Pulse 90   Temp 98.4 °F (36.9 °C)   Resp 16   Ht 5' 2.5\" (1.588 m)   Wt 84.4 kg (186 lb)   SpO2 99%   BMI 33.48 kg/m²     Physical Exam  Vitals reviewed.   Constitutional:       Appearance: Normal appearance. She is well-developed.   HENT:      Head: Normocephalic and atraumatic.      Right Ear: Tympanic membrane, ear canal and external ear normal. There is no impacted cerumen.      Left Ear: Tympanic membrane, ear canal and external ear normal. There is no impacted cerumen.      Nose: Nose normal.      Mouth/Throat:      Mouth: Mucous membranes are moist.      Pharynx: Oropharynx is clear.   Eyes:      Conjunctiva/sclera: Conjunctivae normal.      Pupils: Pupils are equal, round, and reactive to light.   Cardiovascular:      Rate and Rhythm: Normal rate and regular rhythm.      Heart sounds: Normal heart sounds.   Pulmonary:      Effort: Pulmonary effort is normal.      Breath sounds: Normal breath sounds.      Comments: Cough but not hearing any wheezing  Abdominal:      General: Abdomen is flat. Bowel sounds are normal.      Palpations: Abdomen is soft.   Musculoskeletal:         General: Normal range of motion.      Cervical back: Normal range of motion and neck supple.   Skin:     General: Skin is warm.      Capillary Refill: Capillary refill takes less than 2 seconds.   Neurological:      General: No focal deficit present.      Mental Status: She is alert and oriented to person, place, and time. Mental status is at baseline.   Psychiatric:         Mood and Affect: Mood normal.         Behavior: Behavior normal.         Thought Content: Thought content normal.    "      Judgment: Judgment normal.          Liliya Mabry MD   [facility]

## 2023-12-24 DIAGNOSIS — F41.9 ANXIETY: ICD-10-CM

## 2023-12-26 RX ORDER — PAROXETINE 10 MG/1
10 TABLET, FILM COATED ORAL DAILY
Qty: 90 TABLET | Refills: 1 | Status: SHIPPED | OUTPATIENT
Start: 2023-12-26

## 2024-01-02 ENCOUNTER — TELEPHONE (OUTPATIENT)
Age: 24
End: 2024-01-02

## 2024-01-02 NOTE — TELEPHONE ENCOUNTER
Patient's mother Kelle Rivers called requesting when her last Depo Vaccine shot was. I advised her that there was not a Medical Communication Consent on file. Patient gave verbal consent on phone to access her chart. I advised her mother Kelle the last shot was on 10/3/2023 and offered to maker her next appointment. She declined and stated she will call back.

## 2024-01-03 ENCOUNTER — TELEPHONE (OUTPATIENT)
Age: 24
End: 2024-01-03

## 2024-01-04 NOTE — TELEPHONE ENCOUNTER
Called patient, she usually sees Dr Mabry. 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 He had stroke about 2 years ago.  She will drop of her paystub on 1/8 to prove that this is her employment. Needs letter excusing from jury duty. I advised that Dr Mabry is out on leave at this time.

## 2024-01-10 NOTE — TELEPHONE ENCOUNTER
Patient dropped off jury excuse from which I placed in Dr Landon's folder. Patient advised he is out of the office until Friday.

## 2024-06-24 ENCOUNTER — OFFICE VISIT (OUTPATIENT)
Dept: FAMILY MEDICINE CLINIC | Facility: CLINIC | Age: 24
End: 2024-06-24
Payer: COMMERCIAL

## 2024-06-24 VITALS
RESPIRATION RATE: 18 BRPM | HEART RATE: 73 BPM | WEIGHT: 186.6 LBS | OXYGEN SATURATION: 98 % | DIASTOLIC BLOOD PRESSURE: 72 MMHG | SYSTOLIC BLOOD PRESSURE: 110 MMHG | BODY MASS INDEX: 33.59 KG/M2 | TEMPERATURE: 98.3 F

## 2024-06-24 DIAGNOSIS — F41.9 ANXIETY: Primary | ICD-10-CM

## 2024-06-24 PROCEDURE — 99213 OFFICE O/P EST LOW 20 MIN: CPT | Performed by: FAMILY MEDICINE

## 2024-06-24 NOTE — PROGRESS NOTES
aBrt Sosa 2000 female MRN: [id number]    [facility] PRACTICE OFFICE VISIT  [facility]      ASSESSMENT/PLAN  Bart Sosa is a 23 y.o. female presents to the office for    Diagnoses and all orders for this visit:    Anxiety       CARPE hand and feet; for excessive sweating in her hands  If no improvement recommend seeing dermatology  Currently off all medications for anxiety.  At baseline therefore okay to remain off of meds           Future Appointments   Date Time Provider Department Center   12/20/2024  8:00 AM Liliya Mejia MD [facility]          SUBJECTIVE  CC: Follow-up      HPI:  Bart Sosa is a 23 y.o. female who presents for an follow up.  Patient states that she has excessive sweatiness on her hands and feet.  Would like guidance if possible.  Stopped all her anxiety medications.  States that she has been doing very well and is currently employed as a     Review of Systems   Constitutional:  Negative for activity change, appetite change, chills, fatigue and fever.   HENT:  Negative for congestion.    Respiratory:  Negative for cough, chest tightness and shortness of breath.    Cardiovascular:  Negative for chest pain and leg swelling.   Gastrointestinal:  Negative for abdominal distention, abdominal pain, constipation, diarrhea, nausea and vomiting.   All other systems reviewed and are negative.      Historical Information   The patient history was reviewed as follows:  Past Medical History:   Diagnosis Date   • Allergic    • Heart murmur    • Tachycardia 12/14/2018         Medications:   No current outpatient medications on file.    No Known Allergies    OBJECTIVE  Vitals:   Vitals:    06/24/24 1038   BP: 110/72   BP Location: Left arm   Patient Position: Sitting   Cuff Size: Extra-Large   Pulse: 73   Resp: 18   Temp: 98.3 °F (36.8 °C)   TempSrc: Temporal   SpO2: 98%   Weight: 84.6 kg (186 lb 9.6 oz)          Physical Exam  Vitals reviewed.   Constitutional:       Appearance: She is well-developed.   HENT:      Head: Normocephalic and atraumatic.   Eyes:      Extraocular Movements: EOM normal.      Conjunctiva/sclera: Conjunctivae normal.      Pupils: Pupils are equal, round, and reactive to light.   Cardiovascular:      Rate and Rhythm: Normal rate and regular rhythm.      Heart sounds: Normal heart sounds, S1 normal and S2 normal. No murmur heard.  Pulmonary:      Effort: Pulmonary effort is normal. No respiratory distress.      Breath sounds: Normal breath sounds. No wheezing.   Musculoskeletal:         General: No edema. Normal range of motion.      Cervical back: Normal range of motion and neck supple.   Skin:     General: Skin is warm.   Neurological:      Mental Status: She is alert and oriented to person, place, and time.   Psychiatric:         Mood and Affect: Mood and affect normal.         Speech: Speech normal.         Behavior: Behavior normal.         Thought Content: Thought content normal.         Judgment: Judgment normal.                    Liliya Mabry MD,   [facility]  6/30/2024       28-Mar-2021 18:42 28-Mar-2021 18:57

## 2024-07-19 ENCOUNTER — TELEPHONE (OUTPATIENT)
Age: 24
End: 2024-07-19

## 2024-07-19 ENCOUNTER — TELEMEDICINE (OUTPATIENT)
Dept: FAMILY MEDICINE CLINIC | Facility: CLINIC | Age: 24
End: 2024-07-19
Payer: COMMERCIAL

## 2024-07-19 VITALS — BODY MASS INDEX: 32.96 KG/M2 | WEIGHT: 186 LBS | HEIGHT: 63 IN | TEMPERATURE: 97.5 F

## 2024-07-19 DIAGNOSIS — K08.89 TOOTH PAIN: Primary | ICD-10-CM

## 2024-07-19 DIAGNOSIS — N92.6 ABNORMAL MENSTRUAL PERIODS: ICD-10-CM

## 2024-07-19 PROCEDURE — 99213 OFFICE O/P EST LOW 20 MIN: CPT | Performed by: FAMILY MEDICINE

## 2024-07-19 RX ORDER — AMOXICILLIN AND CLAVULANATE POTASSIUM 875; 125 MG/1; MG/1
1 TABLET, FILM COATED ORAL EVERY 12 HOURS SCHEDULED
Qty: 14 TABLET | Refills: 0 | Status: SHIPPED | OUTPATIENT
Start: 2024-07-19 | End: 2024-07-26

## 2024-07-19 RX ORDER — METHYLPREDNISOLONE 4 MG/1
TABLET ORAL
Qty: 21 EACH | Refills: 0 | Status: SHIPPED | OUTPATIENT
Start: 2024-07-19

## 2024-07-19 NOTE — PROGRESS NOTES
Virtual Regular Visit  Name: Bart Sosa      : 2000      MRN: [id number]  Encounter Provider: Liliya Mabry MD  Encounter Date: 2024   Encounter department: [facility]    Verification of patient location:    Patient is located at Home in the following state in which I hold an active license [place]    Assessment & Plan   1. Tooth pain  -     methylPREDNISolone 4 MG tablet therapy pack; Use as directed on package  -     amoxicillin-clavulanate (AUGMENTIN) 875-125 mg per tablet; Take 1 tablet by mouth every 12 (twelve) hours for 7 days  2. Abnormal menstrual periods  #1 tooth pain recommend patient speaking to the dentist.  Treatment initiated.  #2 abnormal menstrual periods if it continues to persist please notify us       Encounter provider Liliya Mabry MD    The patient was identified by name and date of birth. Bart Sosa was informed that this is a telemedicine visit and that the visit is being conducted through the Epic Embedded platform. She agrees to proceed..  My office door was closed. No one else was in the room.  She acknowledged consent and understanding of privacy and security of the video platform. The patient has agreed to participate and understands they can discontinue the visit at any time.    Patient is aware this is a billable service.     History of Present Illness     HPI  23-year-old female presenting to the office via video.  States that she has been having dental pain and swelling.  Has not been able to see a dentist yet.  Patient also states that she has been having abnormal periods ever since stopping the Depo shot.  States that they have been lasting a lot longer than normal.    Review of Systems   Constitutional:  Negative for activity change, appetite change, chills, fatigue and fever.   HENT:  Positive for dental problem. Negative for congestion.    Respiratory:  Negative for cough, chest tightness and shortness of breath.    Cardiovascular:   "Negative for chest pain and leg swelling.   Gastrointestinal:  Negative for abdominal distention, abdominal pain, constipation, diarrhea, nausea and vomiting.   Genitourinary:  Positive for menstrual problem.   All other systems reviewed and are negative.    No current outpatient medications on file prior to visit.     No current facility-administered medications on file prior to visit.      Objective     Temp 97.5 °F (36.4 °C)   Ht 5' 2.5\" (1.588 m)   Wt 84.4 kg (186 lb)   LMP 07/10/2024   BMI 33.48 kg/m²   Physical Exam  Constitutional:       Appearance: She is well-developed.   HENT:      Head: Normocephalic and atraumatic.   Pulmonary:      Effort: Pulmonary effort is normal.   Musculoskeletal:         General: Normal range of motion.   Neurological:      Mental Status: She is alert and oriented to person, place, and time.   Psychiatric:         Behavior: Behavior normal.         Thought Content: Thought content normal.         Judgment: Judgment normal.         Visit Time  Total Visit Duration: 15        "

## 2024-07-19 NOTE — TELEPHONE ENCOUNTER
Patient called in stating she wanted a virtual appt because she does not have a car. Patient stated she has facial swelling due to her wisdom tooth coming in (patient does not have a dentist). Also wanted to ask Dr. Mabry how long she will be bleeding for because she was on the depo injection since 2022 but stopped getting it back in October and just started her period on 7/10 and read that she could bleed up to a month. Attempted to contact clerical and was unsuccessful. Please advise. Thank you.

## 2024-09-19 ENCOUNTER — OFFICE VISIT (OUTPATIENT)
Dept: FAMILY MEDICINE CLINIC | Facility: CLINIC | Age: 24
End: 2024-09-19
Payer: COMMERCIAL

## 2024-09-19 VITALS
SYSTOLIC BLOOD PRESSURE: 120 MMHG | HEART RATE: 86 BPM | DIASTOLIC BLOOD PRESSURE: 76 MMHG | RESPIRATION RATE: 18 BRPM | HEIGHT: 63 IN | WEIGHT: 177.8 LBS | OXYGEN SATURATION: 99 % | BODY MASS INDEX: 31.5 KG/M2 | TEMPERATURE: 98.2 F

## 2024-09-19 DIAGNOSIS — R09.81 NASAL CONGESTION: Primary | ICD-10-CM

## 2024-09-19 DIAGNOSIS — F41.9 ANXIETY: ICD-10-CM

## 2024-09-19 PROCEDURE — 99213 OFFICE O/P EST LOW 20 MIN: CPT | Performed by: FAMILY MEDICINE

## 2024-09-19 RX ORDER — FLUTICASONE PROPIONATE 50 MCG
1 SPRAY, SUSPENSION (ML) NASAL DAILY
Qty: 18 ML | Refills: 0 | Status: SHIPPED | OUTPATIENT
Start: 2024-09-19

## 2024-09-19 RX ORDER — FEXOFENADINE HCL 180 MG/1
180 TABLET ORAL DAILY
Qty: 30 TABLET | Refills: 0 | Status: SHIPPED | OUTPATIENT
Start: 2024-09-19

## 2024-09-19 RX ORDER — DOXYCYCLINE 100 MG/1
100 CAPSULE ORAL EVERY 12 HOURS SCHEDULED
Qty: 14 CAPSULE | Refills: 0 | Status: SHIPPED | OUTPATIENT
Start: 2024-09-19 | End: 2024-09-26

## 2024-09-19 NOTE — PROGRESS NOTES
Bart Sosa 2000 female MRN: [id number]    [facility] OFFICE VISIT  [facility]      ASSESSMENT/PLAN  Bart Sosa is a 24 y.o. female presents to the office for    Diagnoses and all orders for this visit:    Nasal congestion  -     fexofenadine (ALLEGRA) 180 MG tablet; Take 1 tablet (180 mg total) by mouth daily  -     fluticasone (FLONASE) 50 mcg/act nasal spray; 1 spray into each nostril daily  -     doxycycline hyclate (VIBRAMYCIN) 100 mg capsule; Take 1 capsule (100 mg total) by mouth every 12 (twelve) hours for 7 days    Anxiety       Anxiety has been off of medication  Nasal congestion I do believe that is likely allergies recommend Allegra and Flonase to be obtained.  If no improvement initiate antibiotics.  However I do not think that the patient has any sinus infections at this time         Future Appointments   Date Time Provider Department Center   9/19/2024 12:15 PM Liliya Mejia MD [facility]   12/20/2024  8:00 AM Liliya Mejia MD [facility]          SUBJECTIVE  CC: URI (Possible URI//Negative COVID Tuesday)      HPI:  Bart Sosa is a 24 y.o. female who presents for an acute appointment.  Stuffy nose since Tuesday.  States that all her coworkers are currently sick.  States that she does not have COVID and was tested.  Patient would like to be evaluated just to be safe.  Has been off of her anxiety medication  Review of Systems   Constitutional:  Negative for activity change, appetite change, chills, fatigue and fever.   HENT:  Positive for congestion.    Respiratory:  Negative for cough, chest tightness and shortness of breath.    Cardiovascular:  Negative for chest pain and leg swelling.   Gastrointestinal:  Negative for abdominal distention, abdominal pain, constipation, diarrhea, nausea and vomiting.   All other systems reviewed and are negative.      Historical Information   The patient  "history was reviewed as follows:  Past Medical History:   Diagnosis Date    Allergic     Heart murmur     Tachycardia 12/14/2018         Medications:     Current Outpatient Medications:     methylPREDNISolone 4 MG tablet therapy pack, Use as directed on package (Patient not taking: Reported on 9/19/2024), Disp: 21 each, Rfl: 0    No Known Allergies    OBJECTIVE  Vitals:   Vitals:    09/19/24 1153   BP: 120/76   BP Location: Left arm   Patient Position: Sitting   Cuff Size: Standard   Pulse: 86   Resp: 18   Temp: 98.2 °F (36.8 °C)   TempSrc: Temporal   SpO2: 99%   Weight: 80.6 kg (177 lb 12.8 oz)   Height: 5' 2.5\" (1.588 m)         Physical Exam  Vitals reviewed.   Constitutional:       Appearance: She is well-developed.   HENT:      Head: Normocephalic and atraumatic.      Right Ear: A middle ear effusion is present.      Left Ear: A middle ear effusion is present.      Nose: Mucosal edema present.   Eyes:      Extraocular Movements: EOM normal.      Conjunctiva/sclera: Conjunctivae normal.      Pupils: Pupils are equal, round, and reactive to light.   Cardiovascular:      Rate and Rhythm: Normal rate and regular rhythm.      Heart sounds: Normal heart sounds, S1 normal and S2 normal. No murmur heard.  Pulmonary:      Effort: Pulmonary effort is normal. No respiratory distress.      Breath sounds: Normal breath sounds. No wheezing.   Musculoskeletal:         General: No edema. Normal range of motion.      Cervical back: Normal range of motion and neck supple.   Skin:     General: Skin is warm.   Neurological:      Mental Status: She is alert and oriented to person, place, and time.   Psychiatric:         Mood and Affect: Mood and affect normal.         Speech: Speech normal.         Behavior: Behavior normal.         Thought Content: Thought content normal.         Judgment: Judgment normal.                    Liliya Mabry MD,   [facility]  9/19/2024      "

## 2024-12-20 ENCOUNTER — OFFICE VISIT (OUTPATIENT)
Dept: FAMILY MEDICINE CLINIC | Facility: CLINIC | Age: 24
End: 2024-12-20
Payer: COMMERCIAL

## 2024-12-20 VITALS
OXYGEN SATURATION: 99 % | HEART RATE: 99 BPM | BODY MASS INDEX: 31.68 KG/M2 | DIASTOLIC BLOOD PRESSURE: 70 MMHG | HEIGHT: 63 IN | TEMPERATURE: 98.3 F | RESPIRATION RATE: 16 BRPM | SYSTOLIC BLOOD PRESSURE: 98 MMHG | WEIGHT: 178.8 LBS

## 2024-12-20 DIAGNOSIS — Z00.00 ANNUAL PHYSICAL EXAM: Primary | ICD-10-CM

## 2024-12-20 DIAGNOSIS — Z12.4 SCREENING FOR CERVICAL CANCER: ICD-10-CM

## 2024-12-20 PROCEDURE — 99395 PREV VISIT EST AGE 18-39: CPT | Performed by: FAMILY MEDICINE

## 2024-12-20 PROCEDURE — 99173 VISUAL ACUITY SCREEN: CPT | Performed by: FAMILY MEDICINE

## 2024-12-20 NOTE — PROGRESS NOTES
Adult Annual Physical  Name: Bart Sosa      : 2000      MRN: [id number]  Encounter Provider: Liliya Mejia MD  Encounter Date: 2024   Encounter department: [facility]    Assessment & Plan  Annual physical exam  Patient had a slightly abnormal sugar last year recommend a repeat.  I am so proud of her she is lost 15 pounds.  Keep up the good work  Orders:  •  CBC and differential; Future  •  Comprehensive metabolic panel; Future  •  Lipid panel; Future    Screening for cervical cancer  She declined today.  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     Diet and Physical Activity:  - Diet/Nutrition: well balanced diet.  - Exercise: walking. at work    General Health:  - Sleep: sleeps well.  - Hearing: normal hearing right ear and normal hearing left ear.  - Vision: wears glasses.  - Dental: regular dental visits.    Review of Systems   Constitutional:  Negative for activity change, appetite change, chills, fatigue and fever.   HENT:  Negative for congestion.    Respiratory:  Positive for cough. Negative for chest tightness and shortness of breath.    Cardiovascular:  Negative for chest pain and leg swelling.   Gastrointestinal:  Negative for abdominal distention, abdominal pain, constipation, diarrhea, nausea and vomiting.   All other systems reviewed and are negative.    Current Outpatient Medications on File Prior to Visit   Medication Sig Dispense Refill   • [DISCONTINUED] fexofenadine (ALLEGRA) 180 MG tablet Take 1 tablet (180 mg total) by mouth daily (Patient not taking: Reported on 2024) 30 tablet 0   • [DISCONTINUED] fluticasone (FLONASE) 50 mcg/act nasal spray 1 spray into each nostril daily (Patient not taking: Reported on 2024) 18 mL 0   • [DISCONTINUED]  "methylPREDNISolone 4 MG tablet therapy pack Use as directed on package (Patient not taking: Reported on 12/20/2024) 21 each 0     No current facility-administered medications on file prior to visit.      Social History     Tobacco Use   • Smoking status: Never     Passive exposure: Past   • Smokeless tobacco: Never   • Tobacco comments:     vapes   Vaping Use   • Vaping status: Every Day   • Substances: Nicotine, Flavoring   Substance and Sexual Activity   • Alcohol use: No   • Drug use: Yes     Frequency: 7.0 times per week     Types: Marijuana     Comment: uses daily   • Sexual activity: Yes     Partners: Male       Objective   BP 98/70 (BP Location: Left arm, Patient Position: Sitting, Cuff Size: Standard)   Pulse 99   Temp 98.3 °F (36.8 °C) (Temporal)   Resp 16   Ht 5' 2.5\" (1.588 m)   Wt 81.1 kg (178 lb 12.8 oz)   LMP 11/06/2024   SpO2 99%   BMI 32.18 kg/m²     Physical Exam  Vitals reviewed.   Constitutional:       Appearance: Normal appearance. She is well-developed.   HENT:      Head: Normocephalic and atraumatic.      Right Ear: Tympanic membrane, ear canal and external ear normal. There is no impacted cerumen.      Left Ear: Tympanic membrane, ear canal and external ear normal. There is no impacted cerumen.      Nose: Nose normal.      Mouth/Throat:      Mouth: Mucous membranes are moist.      Pharynx: Oropharynx is clear.   Eyes:      Conjunctiva/sclera: Conjunctivae normal.      Pupils: Pupils are equal, round, and reactive to light.   Cardiovascular:      Rate and Rhythm: Normal rate and regular rhythm.      Heart sounds: Normal heart sounds.   Pulmonary:      Effort: Pulmonary effort is normal.      Breath sounds: Normal breath sounds.   Abdominal:      General: Abdomen is flat. Bowel sounds are normal.      Palpations: Abdomen is soft.   Musculoskeletal:         General: Normal range of motion.      Cervical back: Normal range of motion and neck supple.   Skin:     General: Skin is warm.      " Capillary Refill: Capillary refill takes less than 2 seconds.   Neurological:      General: No focal deficit present.      Mental Status: She is alert and oriented to person, place, and time. Mental status is at baseline.   Psychiatric:         Mood and Affect: Mood normal.         Behavior: Behavior normal.         Thought Content: Thought content normal.         Judgment: Judgment normal.

## 2024-12-24 ENCOUNTER — TELEPHONE (OUTPATIENT)
Age: 24
End: 2024-12-24

## 2024-12-24 ENCOUNTER — TELEMEDICINE (OUTPATIENT)
Dept: FAMILY MEDICINE CLINIC | Facility: CLINIC | Age: 24
End: 2024-12-24
Payer: COMMERCIAL

## 2024-12-24 DIAGNOSIS — R21 RASH OF BOTH HANDS: Primary | ICD-10-CM

## 2024-12-24 PROCEDURE — 99213 OFFICE O/P EST LOW 20 MIN: CPT

## 2024-12-24 RX ORDER — HYDROCORTISONE 25 MG/G
CREAM TOPICAL 2 TIMES DAILY
Qty: 3.5 G | Refills: 0 | Status: SHIPPED | OUTPATIENT
Start: 2024-12-24

## 2024-12-24 NOTE — PROGRESS NOTES
Virtual Regular Visit  Name: Bart Sosa      : 2000      MRN: [id number]  Encounter Provider: WILFREDO Calixto  Encounter Date: 2024   Encounter department: [facility]      Verification of patient location:  Patient is located at Home in the following state in which I hold an active license [place] :  Assessment & Plan  Rash of both hands  Try to limit the use of latex gloves  Try to get non latex gloves supplied at work  Monitor for any other areas related to the laundry detergent change  If she has any swelling or itching to try benadryl  Follow up in a week to see if rash has subsided  Orders:  •  hydrocortisone 2.5 % cream; Apply topically 2 (two) times a day        Encounter provider WILFREDO Calixto    The patient was identified by name and date of birth. Bart Sosa was informed that this is a telemedicine visit and that the visit is being conducted through the Epic Embedded platform. She agrees to proceed..  My office door was closed. No one else was in the room.  She acknowledged consent and understanding of privacy and security of the video platform. The patient has agreed to participate and understands they can discontinue the visit at any time.    Patient is aware this is a billable service.     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 States they also changed laundry detergent at home this week.     Rash  This is a new problem. The current episode started today. The problem is unchanged. The affected locations include the left hand and right hand. The problem is mild. The rash is characterized by dryness and redness. She was exposed to nothing. The rash first occurred at work. Pertinent negatives include no fatigue, fever, itching, joint pain, shortness of breath, sore throat or vomiting. Past treatments include  nothing. The treatment provided no relief. There is no history of allergies, asthma or eczema. There were no sick contacts.     Review of Systems   Constitutional: Negative.  Negative for fatigue and fever.   HENT: Negative.  Negative for sore throat.    Eyes: Negative.    Respiratory: Negative.  Negative for shortness of breath.    Cardiovascular: Negative.    Gastrointestinal: Negative.  Negative for vomiting.   Endocrine: Negative.    Genitourinary: Negative.    Musculoskeletal: Negative.  Negative for joint pain.   Skin:  Positive for rash (both hands). Negative for itching.   Allergic/Immunologic: Negative.    Neurological: Negative.    Hematological: Negative.    Psychiatric/Behavioral: Negative.         Objective     [facility] 11/06/2024     Physical Exam  Constitutional:       Appearance: Normal appearance.   Skin:     Coloration: Skin is not pale.      Findings: Erythema (hands) and rash (both hands) present.   Neurological:      General: No focal deficit present.      Mental Status: She is alert and oriented to person, place, and time. Mental status is at baseline.   Psychiatric:         Mood and Affect: Mood normal.         Behavior: Behavior normal.         Thought Content: Thought content normal.         Judgment: Judgment normal.         Visit Time  Total Visit Duration: 17

## 2024-12-24 NOTE — TELEPHONE ENCOUNTER
Patient has virtual same day appt scheduled for today at 1:00. Please send virtual link to patient's cell: [phone]

## 2025-04-23 ENCOUNTER — TELEPHONE (OUTPATIENT)
Dept: FAMILY MEDICINE CLINIC | Facility: CLINIC | Age: 25
End: 2025-04-23

## 2025-04-23 ENCOUNTER — CLINICAL SUPPORT (OUTPATIENT)
Dept: FAMILY MEDICINE CLINIC | Facility: CLINIC | Age: 25
End: 2025-04-23
Payer: COMMERCIAL

## 2025-04-23 DIAGNOSIS — Z23 NEED FOR VACCINATION: Primary | ICD-10-CM

## 2025-04-23 PROCEDURE — 86580 TB INTRADERMAL TEST: CPT

## 2025-04-23 NOTE — TELEPHONE ENCOUNTER
Patient dropped off form that needs to be completed by Friday 4/25 for new job at  that she starts on 4/28. Patient had PE 12/2024 with Dr Mabry but needs ppd which I scheduled today with nurse. She will come back on Friday for read. Form placed in Dr Mabry folder and patient was advised that Dr Mabry is not in office today.

## 2025-04-25 ENCOUNTER — CLINICAL SUPPORT (OUTPATIENT)
Dept: FAMILY MEDICINE CLINIC | Facility: CLINIC | Age: 25
End: 2025-04-25

## 2025-04-25 DIAGNOSIS — Z11.1 ENCOUNTER FOR PPD SKIN TEST READING: Primary | ICD-10-CM

## 2025-04-25 LAB
INDURATION: 0 MM
TB SKIN TEST: NEGATIVE

## 2025-04-25 PROCEDURE — RECHECK

## 2025-04-25 NOTE — PROGRESS NOTES
From: Zaira Chatman  To: Adelina Dillard MD  Sent: 11/12/2019 9:33 PM CST  Subject: Medication Question    Hello, I recently was in for my annual and the prescription refills were that was sent into the pharmacy. The prescription for Estridol was previously 1mg but the new one is for .5mg. Can you please resend for the correct dosage.    Secondly, I called and left a message with the nurse yesterday about getting tested for gluten intolerance. I have not heard back regarding this.    Thanks,   Patient seen today for a PPD test reading. Test was negative.

## 2025-06-05 ENCOUNTER — ULTRASOUND (OUTPATIENT)
Dept: OBGYN CLINIC | Facility: CLINIC | Age: 25
End: 2025-06-05

## 2025-06-05 VITALS — SYSTOLIC BLOOD PRESSURE: 110 MMHG | BODY MASS INDEX: 31.68 KG/M2 | DIASTOLIC BLOOD PRESSURE: 72 MMHG | WEIGHT: 176 LBS

## 2025-06-05 DIAGNOSIS — Z36.82 ENCOUNTER FOR (NT) NUCHAL TRANSLUCENCY SCAN: ICD-10-CM

## 2025-06-05 DIAGNOSIS — Z36.89 ESTABLISH GESTATIONAL AGE, ULTRASOUND: Primary | ICD-10-CM

## 2025-06-05 NOTE — PROGRESS NOTES
Viability and Dating Scan    Assessment    Bart Sosa is a 24 y.o.  presents for dating and viability. Patient's last menstrual period was 2025.      Intrauterine vasquez pregnancy confirmed with dating inconsistent with LMP.       Additional Findings: none     FHR: 169    Assigning a Final JESUS  Please choose how you are assigning the JESUS: The gestational age by LMP is </= 8w 6d and demonstrates more than 5 days days difference from the gestational age by CRL, therefore the final JESUS will be based on the ultrasound at this encounter    Final JESUS: 2026 by ultrasound at this encounter.        WILFREDO Godfrey  [facility] [address] [address]  Dept: [phone]  Dept Fax: [phone]  Loc Appt: [phone]  Loc: [phone]  Loc Fax: [phone]     Plan    - Prenatal vitamin and appropriate folic acid supplementation reviewed  - MF referral provided  - Return to office in 2 weeks for nursing intake visit  - Return to office in 4 weeks for initial prenatal visit    ____________________________________________________________     Subjective    Bart Sosa is a 24 y.o.  at 8w 2d gestation with an unplanned but welcomed pregnancy. She is doing well, with occasional nausea.     # 1 - Date: None, Sex: None, Weight: None, GA: None, Type: None, Apgar1: None, Apgar5: None, Living: None, Birth Comments: None      Past Medical History[1]    Past Surgical History[2]    Social History     Socioeconomic History    Marital status: Single     Spouse name: Not on file    Number of children: Not on file    Years of education: Not on file    Highest education level: Not on file   Occupational History    Not on file   Tobacco Use    Smoking status: Never     Passive exposure: Past    Smokeless tobacco: Never    Tobacco comments:     vapes   Vaping Use    Vaping status: Every Day    Substances: Nicotine,  Flavoring   Substance and Sexual Activity    Alcohol use: No    Drug use: Yes     Frequency: 7.0 times per week     Types: Marijuana     Comment: uses daily    Sexual activity: Yes     Partners: Male   Other Topics Concern    Not on file   Social History Narrative    Not on file     Social Drivers of Health     Financial Resource Strain: Not on file   Food Insecurity: Not on file   Transportation Needs: Not on file   Physical Activity: Not on file   Stress: Not on file   Social Connections: Not on file   Intimate Partner Violence: Not on file   Housing Stability: Not on file       Objective    Visit Vitals  /72 (BP Location: Left arm, Cuff Size: Standard)   Wt 79.8 kg (176 lb)   LMP 2024   BMI 31.68 kg/m²   OB Status Pregnant   Smoking Status Never   BSA 1.82 m²       Ultrasound completed, please see Chart Review - Ob Procedures, Ultrasound Report for Interpretation.                        Ultrasound Probe Disinfection    A transvaginal ultrasound was performed.   Prior to use, disinfection was performed with High Level Disinfection Process (Kiddie Kiston)  Probe serial number [id number] - [id number] was used    WILFREDO Godfrey  25  3:50 PM           [1]   Past Medical History:  Diagnosis Date    Allergic     Heart murmur     Tachycardia 2018   [2] No past surgical history on file.